# Patient Record
Sex: FEMALE | Race: WHITE | NOT HISPANIC OR LATINO | Employment: OTHER | URBAN - METROPOLITAN AREA
[De-identification: names, ages, dates, MRNs, and addresses within clinical notes are randomized per-mention and may not be internally consistent; named-entity substitution may affect disease eponyms.]

---

## 2023-05-10 ENCOUNTER — APPOINTMENT (OUTPATIENT)
Dept: GENERAL RADIOLOGY | Facility: CLINIC | Age: 73
End: 2023-05-10
Attending: EMERGENCY MEDICINE
Payer: MEDICARE

## 2023-05-10 ENCOUNTER — TRANSFERRED RECORDS (OUTPATIENT)
Dept: HEALTH INFORMATION MANAGEMENT | Facility: CLINIC | Age: 73
End: 2023-05-10

## 2023-05-10 ENCOUNTER — HOSPITAL ENCOUNTER (OUTPATIENT)
Facility: CLINIC | Age: 73
Setting detail: OBSERVATION
Discharge: HOME | End: 2023-05-11
Attending: EMERGENCY MEDICINE | Admitting: INTERNAL MEDICINE
Payer: MEDICARE

## 2023-05-10 DIAGNOSIS — I48.91 ATRIAL FIBRILLATION WITH RVR (H): ICD-10-CM

## 2023-05-10 DIAGNOSIS — I42.9 CARDIOMYOPATHY, UNSPECIFIED TYPE (H): Primary | ICD-10-CM

## 2023-05-10 LAB
ANION GAP SERPL CALCULATED.3IONS-SCNC: 13 MMOL/L (ref 7–15)
ATRIAL RATE - MUSE: NORMAL BPM
BASOPHILS # BLD AUTO: 0.1 10E3/UL (ref 0–0.2)
BASOPHILS NFR BLD AUTO: 1 %
BUN SERPL-MCNC: 26.4 MG/DL (ref 8–23)
CALCIUM SERPL-MCNC: 9.6 MG/DL (ref 8.8–10.2)
CHLORIDE SERPL-SCNC: 103 MMOL/L (ref 98–107)
CREAT SERPL-MCNC: 1.31 MG/DL (ref 0.51–0.95)
DEPRECATED HCO3 PLAS-SCNC: 23 MMOL/L (ref 22–29)
DIASTOLIC BLOOD PRESSURE - MUSE: NORMAL MMHG
EOSINOPHIL # BLD AUTO: 0.2 10E3/UL (ref 0–0.7)
EOSINOPHIL NFR BLD AUTO: 3 %
ERYTHROCYTE [DISTWIDTH] IN BLOOD BY AUTOMATED COUNT: 13.8 % (ref 10–15)
GFR SERPL CREATININE-BSD FRML MDRD: 43 ML/MIN/1.73M2
GLUCOSE SERPL-MCNC: 100 MG/DL (ref 70–99)
HCT VFR BLD AUTO: 42.8 % (ref 35–47)
HGB BLD-MCNC: 14.2 G/DL (ref 11.7–15.7)
IMM GRANULOCYTES # BLD: 0 10E3/UL
IMM GRANULOCYTES NFR BLD: 0 %
INTERPRETATION ECG - MUSE: NORMAL
LYMPHOCYTES # BLD AUTO: 1.3 10E3/UL (ref 0.8–5.3)
LYMPHOCYTES NFR BLD AUTO: 21 %
MAGNESIUM SERPL-MCNC: 1.9 MG/DL (ref 1.7–2.3)
MCH RBC QN AUTO: 32.1 PG (ref 26.5–33)
MCHC RBC AUTO-ENTMCNC: 33.2 G/DL (ref 31.5–36.5)
MCV RBC AUTO: 97 FL (ref 78–100)
MONOCYTES # BLD AUTO: 0.6 10E3/UL (ref 0–1.3)
MONOCYTES NFR BLD AUTO: 10 %
NEUTROPHILS # BLD AUTO: 4 10E3/UL (ref 1.6–8.3)
NEUTROPHILS NFR BLD AUTO: 65 %
NRBC # BLD AUTO: 0 10E3/UL
NRBC BLD AUTO-RTO: 0 /100
P AXIS - MUSE: NORMAL DEGREES
PLATELET # BLD AUTO: 246 10E3/UL (ref 150–450)
POTASSIUM SERPL-SCNC: 4.1 MMOL/L (ref 3.4–5.3)
PR INTERVAL - MUSE: NORMAL MS
QRS DURATION - MUSE: 94 MS
QT - MUSE: 344 MS
QTC - MUSE: 494 MS
R AXIS - MUSE: 7 DEGREES
RBC # BLD AUTO: 4.43 10E6/UL (ref 3.8–5.2)
SODIUM SERPL-SCNC: 139 MMOL/L (ref 136–145)
SYSTOLIC BLOOD PRESSURE - MUSE: NORMAL MMHG
T AXIS - MUSE: 71 DEGREES
TROPONIN T SERPL HS-MCNC: 31 NG/L
TROPONIN T SERPL HS-MCNC: 36 NG/L
TROPONIN T SERPL HS-MCNC: 39 NG/L
TSH SERPL DL<=0.005 MIU/L-ACNC: 1.07 UIU/ML (ref 0.3–4.2)
VENTRICULAR RATE- MUSE: 124 BPM
WBC # BLD AUTO: 6.2 10E3/UL (ref 4–11)

## 2023-05-10 PROCEDURE — 84484 ASSAY OF TROPONIN QUANT: CPT | Mod: 91 | Performed by: PHYSICIAN ASSISTANT

## 2023-05-10 PROCEDURE — 96365 THER/PROPH/DIAG IV INF INIT: CPT

## 2023-05-10 PROCEDURE — 250N000013 HC RX MED GY IP 250 OP 250 PS 637: Performed by: PHYSICIAN ASSISTANT

## 2023-05-10 PROCEDURE — 85025 COMPLETE CBC W/AUTO DIFF WBC: CPT | Performed by: EMERGENCY MEDICINE

## 2023-05-10 PROCEDURE — 99285 EMERGENCY DEPT VISIT HI MDM: CPT | Mod: 25

## 2023-05-10 PROCEDURE — 250N000009 HC RX 250: Performed by: EMERGENCY MEDICINE

## 2023-05-10 PROCEDURE — 99222 1ST HOSP IP/OBS MODERATE 55: CPT | Mod: A1 | Performed by: PHYSICIAN ASSISTANT

## 2023-05-10 PROCEDURE — 84484 ASSAY OF TROPONIN QUANT: CPT | Performed by: EMERGENCY MEDICINE

## 2023-05-10 PROCEDURE — 96360 HYDRATION IV INFUSION INIT: CPT

## 2023-05-10 PROCEDURE — 250N000009 HC RX 250: Performed by: PHYSICIAN ASSISTANT

## 2023-05-10 PROCEDURE — 99204 OFFICE O/P NEW MOD 45 MIN: CPT | Mod: FS | Performed by: NURSE PRACTITIONER

## 2023-05-10 PROCEDURE — 258N000003 HC RX IP 258 OP 636: Performed by: PHYSICIAN ASSISTANT

## 2023-05-10 PROCEDURE — G0378 HOSPITAL OBSERVATION PER HR: HCPCS

## 2023-05-10 PROCEDURE — 71046 X-RAY EXAM CHEST 2 VIEWS: CPT

## 2023-05-10 PROCEDURE — 250N000013 HC RX MED GY IP 250 OP 250 PS 637: Performed by: INTERNAL MEDICINE

## 2023-05-10 PROCEDURE — 36415 COLL VENOUS BLD VENIPUNCTURE: CPT | Performed by: PHYSICIAN ASSISTANT

## 2023-05-10 PROCEDURE — 83735 ASSAY OF MAGNESIUM: CPT | Performed by: PHYSICIAN ASSISTANT

## 2023-05-10 PROCEDURE — 36415 COLL VENOUS BLD VENIPUNCTURE: CPT | Performed by: EMERGENCY MEDICINE

## 2023-05-10 PROCEDURE — 93005 ELECTROCARDIOGRAM TRACING: CPT

## 2023-05-10 PROCEDURE — 84443 ASSAY THYROID STIM HORMONE: CPT | Performed by: PHYSICIAN ASSISTANT

## 2023-05-10 PROCEDURE — 250N000013 HC RX MED GY IP 250 OP 250 PS 637: Performed by: NURSE PRACTITIONER

## 2023-05-10 PROCEDURE — 250N000011 HC RX IP 250 OP 636: Performed by: EMERGENCY MEDICINE

## 2023-05-10 PROCEDURE — 96376 TX/PRO/DX INJ SAME DRUG ADON: CPT

## 2023-05-10 PROCEDURE — 258N000003 HC RX IP 258 OP 636: Performed by: EMERGENCY MEDICINE

## 2023-05-10 PROCEDURE — 80048 BASIC METABOLIC PNL TOTAL CA: CPT | Performed by: EMERGENCY MEDICINE

## 2023-05-10 PROCEDURE — 96366 THER/PROPH/DIAG IV INF ADDON: CPT

## 2023-05-10 PROCEDURE — 96361 HYDRATE IV INFUSION ADD-ON: CPT

## 2023-05-10 RX ORDER — ONDANSETRON 2 MG/ML
4 INJECTION INTRAMUSCULAR; INTRAVENOUS EVERY 6 HOURS PRN
Status: DISCONTINUED | OUTPATIENT
Start: 2023-05-10 | End: 2023-05-11 | Stop reason: HOSPADM

## 2023-05-10 RX ORDER — LISINOPRIL 10 MG/1
10 TABLET ORAL DAILY
COMMUNITY
End: 2023-11-22

## 2023-05-10 RX ORDER — PROCHLORPERAZINE 25 MG
12.5 SUPPOSITORY, RECTAL RECTAL EVERY 12 HOURS PRN
Status: DISCONTINUED | OUTPATIENT
Start: 2023-05-10 | End: 2023-05-11 | Stop reason: HOSPADM

## 2023-05-10 RX ORDER — METOPROLOL SUCCINATE 100 MG/1
100 TABLET, EXTENDED RELEASE ORAL DAILY
Status: DISCONTINUED | OUTPATIENT
Start: 2023-05-11 | End: 2023-05-11

## 2023-05-10 RX ORDER — LIDOCAINE 40 MG/G
CREAM TOPICAL
Status: DISCONTINUED | OUTPATIENT
Start: 2023-05-10 | End: 2023-05-11 | Stop reason: HOSPADM

## 2023-05-10 RX ORDER — AMOXICILLIN 250 MG
2 CAPSULE ORAL 2 TIMES DAILY PRN
Status: DISCONTINUED | OUTPATIENT
Start: 2023-05-10 | End: 2023-05-11 | Stop reason: HOSPADM

## 2023-05-10 RX ORDER — ASPIRIN 81 MG/1
81 TABLET ORAL DAILY
Status: ON HOLD | COMMUNITY
End: 2023-05-11

## 2023-05-10 RX ORDER — DILTIAZEM HYDROCHLORIDE 5 MG/ML
15 INJECTION INTRAVENOUS ONCE
Status: COMPLETED | OUTPATIENT
Start: 2023-05-10 | End: 2023-05-10

## 2023-05-10 RX ORDER — POLYETHYLENE GLYCOL 3350 17 G/17G
17 POWDER, FOR SOLUTION ORAL DAILY PRN
Status: DISCONTINUED | OUTPATIENT
Start: 2023-05-10 | End: 2023-05-11 | Stop reason: HOSPADM

## 2023-05-10 RX ORDER — LISINOPRIL 10 MG/1
10 TABLET ORAL DAILY
Status: DISCONTINUED | OUTPATIENT
Start: 2023-05-11 | End: 2023-05-11 | Stop reason: HOSPADM

## 2023-05-10 RX ORDER — ASPIRIN 81 MG/1
81 TABLET ORAL DAILY
Status: DISCONTINUED | OUTPATIENT
Start: 2023-05-11 | End: 2023-05-11

## 2023-05-10 RX ORDER — ATORVASTATIN CALCIUM 10 MG/1
10 TABLET, FILM COATED ORAL EVERY EVENING
Status: DISCONTINUED | OUTPATIENT
Start: 2023-05-10 | End: 2023-05-11 | Stop reason: HOSPADM

## 2023-05-10 RX ORDER — ONDANSETRON 4 MG/1
4 TABLET, ORALLY DISINTEGRATING ORAL EVERY 6 HOURS PRN
Status: DISCONTINUED | OUTPATIENT
Start: 2023-05-10 | End: 2023-05-11 | Stop reason: HOSPADM

## 2023-05-10 RX ORDER — METOPROLOL SUCCINATE 50 MG/1
50 TABLET, EXTENDED RELEASE ORAL DAILY
Status: DISCONTINUED | OUTPATIENT
Start: 2023-05-10 | End: 2023-05-10

## 2023-05-10 RX ORDER — AMOXICILLIN 250 MG
1 CAPSULE ORAL 2 TIMES DAILY PRN
Status: DISCONTINUED | OUTPATIENT
Start: 2023-05-10 | End: 2023-05-11 | Stop reason: HOSPADM

## 2023-05-10 RX ORDER — METOPROLOL SUCCINATE 50 MG/1
50 TABLET, EXTENDED RELEASE ORAL ONCE
Status: COMPLETED | OUTPATIENT
Start: 2023-05-10 | End: 2023-05-10

## 2023-05-10 RX ORDER — ATORVASTATIN CALCIUM 10 MG/1
10 TABLET, FILM COATED ORAL EVERY EVENING
COMMUNITY
End: 2023-09-01 | Stop reason: DRUGHIGH

## 2023-05-10 RX ORDER — PROCHLORPERAZINE MALEATE 5 MG
5 TABLET ORAL EVERY 6 HOURS PRN
Status: DISCONTINUED | OUTPATIENT
Start: 2023-05-10 | End: 2023-05-11 | Stop reason: HOSPADM

## 2023-05-10 RX ADMIN — SODIUM CHLORIDE 1000 ML: 9 INJECTION, SOLUTION INTRAVENOUS at 08:49

## 2023-05-10 RX ADMIN — METOPROLOL SUCCINATE 50 MG: 50 TABLET, EXTENDED RELEASE ORAL at 18:58

## 2023-05-10 RX ADMIN — APIXABAN 5 MG: 5 TABLET, FILM COATED ORAL at 21:34

## 2023-05-10 RX ADMIN — DILTIAZEM HYDROCHLORIDE 5 MG/HR: 5 INJECTION, SOLUTION INTRAVENOUS at 12:25

## 2023-05-10 RX ADMIN — DILTIAZEM HYDROCHLORIDE 15 MG: 5 INJECTION INTRAVENOUS at 11:19

## 2023-05-10 RX ADMIN — METOPROLOL SUCCINATE 50 MG: 50 TABLET, EXTENDED RELEASE ORAL at 15:39

## 2023-05-10 RX ADMIN — DILTIAZEM HYDROCHLORIDE 15 MG/HR: 5 INJECTION, SOLUTION INTRAVENOUS at 21:31

## 2023-05-10 RX ADMIN — ATORVASTATIN CALCIUM 10 MG: 10 TABLET, FILM COATED ORAL at 21:34

## 2023-05-10 ASSESSMENT — ENCOUNTER SYMPTOMS
PALPITATIONS: 0
BLOOD IN STOOL: 0
DIARRHEA: 0
NAUSEA: 0
SHORTNESS OF BREATH: 0

## 2023-05-10 ASSESSMENT — ACTIVITIES OF DAILY LIVING (ADL)
ADLS_ACUITY_SCORE: 35

## 2023-05-10 NOTE — ED PROVIDER NOTES
History   Chief Complaint:  Atrial Fib     The history is provided by the patient.      Alysha Hurst is a 73 year old female with a history of hypertension who presents with Afib. The patient reports that she was at her eye surgery center for cataract and they noticed she is in Afib. She denies any chest pain, nausea, shortness of breath, palpitations, leg swelling, blood in stool, or diarrhea. She had a preoperative visit last week and she was not found to be in Afib, and she also had a physical in February. She denies any history of blood clots, new medication, or recent travel. She had cardiomyopathy in 2000 and saw , and has had no heart issues since. She does not drink alcohol often.     Review of External Notes:  None    ROS:  Review of Systems   Respiratory: Negative for shortness of breath.    Cardiovascular: Negative for chest pain, palpitations and leg swelling.   Gastrointestinal: Negative for blood in stool, diarrhea and nausea.   All other systems reviewed and are negative.    Allergies:  Ciprofloxacin  Sulfacetamide     Medications:    Aspirin   Lipitor   Lisinopril     Past Medical History:    Hypertension   Hyperlipidemia   Cardiomyopathy   Disorder of bone and cartilage, unspecified   Gout     Social History:  The patient presents with her .   PCP: No Ref-Primary, Physician     Physical Exam     Patient Vitals for the past 24 hrs:   BP Temp Temp src Pulse Resp SpO2   05/10/23 1345 (!) 151/102 -- -- 93 16 98 %   05/10/23 1240 (!) 135/107 -- -- 89 19 99 %   05/10/23 1230 -- -- -- 92 18 99 %   05/10/23 1200 -- -- -- 84 14 --   05/10/23 1130 -- -- -- 94 16 --   05/10/23 1100 (!) 158/127 -- -- (!) 135 23 96 %   05/10/23 1030 (!) 139/102 -- -- (!) 128 14 99 %   05/10/23 1000 (!) 131/102 -- -- (!) 134 13 97 %   05/10/23 0937 -- 98  F (36.7  C) Oral -- -- --   05/10/23 0856 (!) 127/105 -- -- (!) 128 18 98 %   05/10/23 0757 (!) 165/99 -- -- (!) 122 16 98 %      Physical Exam  SKIN:   Warm, dry.  HEMATOLOGIC/IMMUNOLOGIC/LYMPHATIC:  No pallor.  Mild equal bilateral lower limb edema, nonpitting.  EYES:  Conjunctivae normal.  CARDIOVASCULAR: Tachycardic rate, irregularly irregular rhythm, no murmur, no rub.  RESPIRATORY:  No respiratory distress, breath sounds equal and normal.  GASTROINTESTINAL:  Soft, nontender abdomen.  MUSCULOSKELETAL: Overweight body habitus.  NEUROLOGIC:  Alert, conversant.  PSYCHIATRIC:  Normal mood.    Emergency Department Course   ECG  ECG taken at 0748, ECG read at 0803  Atrial fibrillation with rapid ventricular response with premature ventricular or aberrantly conducted complexes   Incomplete right bundle branch block    Possible Anteroseptal infarct age undetermined   Abnormal ECG  New Afib as compared to prior, dated 10/30/00.  Rate 124 bpm. CA interval * ms. QRS duration 94 ms. QT/QTc 344/494 ms. P-R-T axes * 7 71.     Imaging:  XR Chest 2 Views   Final Result   IMPRESSION: PA and lateral views of the chest were obtained. Enlarged   cardiac silhouette. Basilar pulmonary opacities, likely atelectasis.   No significant pleural effusion or pneumothorax. Multilevel   degenerative changes of the spine.      JOAQUINA PASCAL MD            SYSTEM ID:  [id number]         Report per radiology    Laboratory:  Labs Ordered and Resulted from Time of ED Arrival to Time of ED Departure   BASIC METABOLIC PANEL - Abnormal       Result Value    Sodium 139      Potassium 4.1      Chloride 103      Carbon Dioxide (CO2) 23      Anion Gap 13      Urea Nitrogen 26.4 (*)     Creatinine 1.31 (*)     Calcium 9.6      Glucose 100 (*)     GFR Estimate 43 (*)    TROPONIN T, HIGH SENSITIVITY - Abnormal    Troponin T, High Sensitivity 39 (*)    TROPONIN T, HIGH SENSITIVITY - Abnormal    Troponin T, High Sensitivity 31 (*)    CBC WITH PLATELETS AND DIFFERENTIAL    WBC Count 6.2      RBC Count 4.43      Hemoglobin 14.2      Hematocrit 42.8      MCV 97      MCH 32.1      MCHC 33.2      RDW 13.8       Platelet Count 246      % Neutrophils 65      % Lymphocytes 21      % Monocytes 10      % Eosinophils 3      % Basophils 1      % Immature Granulocytes 0      NRBCs per 100 WBC 0      Absolute Neutrophils 4.0      Absolute Lymphocytes 1.3      Absolute Monocytes 0.6      Absolute Eosinophils 0.2      Absolute Basophils 0.1      Absolute Immature Granulocytes 0.0      Absolute NRBCs 0.0     TROPONIN T, HIGH SENSITIVITY      Emergency Department Course & Assessments:  Interventions:  Medications   diltiazem (CARDIZEM) 125 mg in sodium chloride 0.9 % 125 mL infusion (10 mg/hr Intravenous Rate/Dose Change 5/10/23 1315)   0.9% sodium chloride BOLUS (0 mLs Intravenous Stopped 5/10/23 1232)   diltiazem (CARDIZEM) injection 15 mg (15 mg Intravenous $Given 5/10/23 1119)     Assessments:  0756 I obtained history and examined the patient as reported above.   1052 I rechecked the patient and discussed her plan of care.     Independent Interpretation (X-rays, CTs, rhythm strip):  Chest x-ray: No pleural effusion, normal cardiac silhouette.    Consultations/Discussion of Management or Tests:  1103 I spoke with Dr. Renae of the hospitalist service from [facility] regarding patient's presentation, findings, and plan of care.    Disposition:  The patient was admitted to the hospital under the care of Dr. Renae.     Impression & Plan    Medical Decision Making:  This patient presents from the surgical center with concern of new onset atrial fibrillation.  The patient did arrive in atrial fibrillation with rapid ventricular response.  Tolerating this well hemodynamically.  She is minimally symptomatic, only complains of episodic dizziness but this has been a longstanding symptom.  Unclear exactly when her atrial fibrillation began.  She had a outpatient preoperative visit last week which she reports was normal regarding her heart rhythm.  EKG was not performed at that time, per the patient.  We initiated hydration and  testing as above.  Testing was reassuring barring slight elevation of the troponin which is likely secondary to rate dependent ischemia.  Rate control was achieved with diltiazem bolus and drip.  The patient lacks any risk factors beyond her age for pulmonary embolism.  Denies chest pain or dyspnea.  I do not think she required a D-dimer nor CT scan of the chest at this time given my low suspicion for PE.  The patient will be admitted for further cardiac care and monitoring.    Diagnosis:    ICD-10-CM    1. Atrial fibrillation with RVR (H)  I48.91             Scribe Disclosure:  ITesfaye, am serving as a scribe at 7:55 AM on 5/10/2023 to document services personally performed by Neo Bishop MD based on my observations and the provider's statements to me.      5/10/2023   Neo Bishop MD Moe, James Thomas, MD  05/10/23 1166

## 2023-05-10 NOTE — CONSULTS
[facility]    Cardiology Consultation     Date of Admission:  5/10/2023    Assessment & Plan   Alysha Hurst is a 73 year old female who was admitted on 5/10/2023.    1. Afib with RVR, new diagnosis  2. Mild troponin elevation likely 2/2 demand ischemia in the setting of #1  3. Hypertension  4. Hyperlipidemia  5. Hx of cardiomyopathy in 2000, saw Dr. Sanders, unclear etiology (no records)      Patient remains in A-fib RVR with heart rates in the 90s to 120s.  She reports being under a lot of stress over the last couple weeks with new medications she was needing to take prior to her cataract surgery.  She is not a heavy drinker.  She has no history of hypothyroidism.  No known structural heart disease and no recent illness.      She is hemodynamically stable.  On diltiazem drip at 15 mL/h.  Unclear how long she has been in atrial fibrillation and given the fact she remains completely asymptomatic, recommend rate control strategy and initiation of anticoagulation for stroke prophylaxis.  We will also obtain echocardiogram to assess LV function as well as any structural abnormalities.  She is euvolemic and does not have any clinical signs of heart failure.    Plan:   1. Agree with diltiazem gtt, wean as tolerated.    2. Start Toprol XL 50 mg daily.   3. Start apixaban 5 mg BID for stroke prophylaxis.   4. Obtain echocardiogram.   5. Will continue to follow.       Moderate complexity     Magalys De Santiago CNP, MD    Primary Care Physician   Physician No Ref-Primary    Reason for Consult   Reason for consult: I was asked to evaluate this patient for AFib RVR.    History of Present Illness   Alysha Hurst is a 73 year old female who presents with Alysha Hurst is a 73 year old female with a past medical history of hypertension, and hyperlipidemia who presented to the emergency department from the eye clinic due to finding of atrial fibrillation with RVR.  Patient reports that she  was at the eye surgery center for a planned cataract surgery today when the staff noticed that she had a rapid heart rate and was in atrial fibrillation with RVR.  Patient denies any known history of A-fib.  She denies any recent chest pain, palpitations, shortness of breath, leg swelling, or other flulike symptoms, fever, chills, or recent illness.     In the emergency department, patient noted to have heart rate in the 120-130s.  Blood pressure in the 130-150s systolic.  Temperature 98.  O2 satting at 98% on RA.  EKG shows A-fib with RVR, RBBB, no overt ischemic changes.  CXR with bilateral ectasis, no other significant abnormalities.  High-sensitivity troponin mildly elevated 39. Potassium 4.1, creatinine 1.3.  No leukocytosis or anemia.  Patient was given IV diltiazem with some improvement of her heart rate, but she is still tachycardic.    Her most recent echocardiogram from 2012 shows preserved LV function with an EF of 65%.     Past Medical History   No past medical history on file.      Past Surgical History   No past surgical history on file.      Prior to Admission Medications   Prior to Admission Medications   Prescriptions Last Dose Informant Patient Reported? Taking?   CALCIUM PO 5/9/2023  Yes Yes   Sig: Take 1 tablet by mouth daily   aspirin 81 MG EC tablet 5/9/2023  Yes Yes   Sig: Take 81 mg by mouth daily   atorvastatin (LIPITOR) 10 MG tablet 5/9/2023  Yes Yes   Sig: Take 10 mg by mouth every evening   lisinopril (ZESTRIL) 10 MG tablet 5/10/2023  Yes Yes   Sig: Take 10 mg by mouth daily      Facility-Administered Medications: None     Current Facility-Administered Medications   Medication Dose Route Frequency     Current Facility-Administered Medications   Medication Last Rate     dilTIAZem 10 mg/hr (05/10/23 1315)     Allergies   Allergies   Allergen Reactions     Ciprofloxacin Other (See Comments)     tendonitis     Sulfacetamide      Happened in high school during mono treatment, pt doesn't recall  reaction.        Social History          Family History            Review of Systems   A comprehensive review of system was performed and is negative other than that noted in the HPI or here.     Physical Exam   Vital Signs with Ranges  Temp:  [98  F (36.7  C)] 98  F (36.7  C)  Pulse:  [] 124  Resp:  [11-23] 17  BP: (124-165)/() 124/90  SpO2:  [96 %-99 %] 97 %  Wt Readings from Last 4 Encounters:   No data found for Wt     No intake/output data recorded.      Vitals: BP (!) 124/90   Pulse (!) 124   Temp 98  F (36.7  C) (Oral)   Resp 17   SpO2 97%     Physical Exam:   General - Alert and oriented to time place and person in no acute distress  Eyes - No scleral icterus  HEENT - Neck supple, moist mucous membranes  Cardiovascular - irregular rate or rhythm   Extremities - There is no edema  Respiratory - CTA bilaterally   Skin - No pallor or cyanosis  Gastrointestinal - Non tender and non distended without rebound or guarding  Psych - Appropriate affect   Neurological - No gross motor neurological focal deficits    No lab results found in last 7 days.    Invalid input(s): TROPONINIES    Recent Labs   Lab 05/10/23  0818   WBC 6.2   HGB 14.2   MCV 97         POTASSIUM 4.1   CHLORIDE 103   CO2 23   BUN 26.4*   CR 1.31*   GFRESTIMATED 43*   ANIONGAP 13   CHLOE 9.6   *     No results for input(s): CHOL, HDL, LDL, TRIG, CHOLHDLRATIO in the last 63027 hours.  Recent Labs   Lab 05/10/23  0818   WBC 6.2   HGB 14.2   HCT 42.8   MCV 97        No results for input(s): PH, PHV, PO2, PO2V, SAT, PCO2, PCO2V, HCO3, HCO3V in the last 168 hours.  No results for input(s): NTBNPI, NTBNP in the last 168 hours.  No results for input(s): DD in the last 168 hours.  No results for input(s): SED, CRP in the last 168 hours.  Recent Labs   Lab 05/10/23  0818        No results for input(s): TSH in the last 168 hours.  No results for input(s): COLOR, APPEARANCE, URINEGLC, URINEBILI, URINEKETONE, SG,  UBLD, URINEPH, PROTEIN, UROBILINOGEN, NITRITE, LEUKEST, RBCU, WBCU in the last 168 hours.    Imaging:  Recent Results (from the past 48 hour(s))   XR Chest 2 Views    Narrative    CHEST TWO VIEWS May 10, 2023 8:37 AM     HISTORY: New onset atrial fibrillation with RVR.    COMPARISON: None available.      Impression    IMPRESSION: PA and lateral views of the chest were obtained. Enlarged  cardiac silhouette. Basilar pulmonary opacities, likely atelectasis.  No significant pleural effusion or pneumothorax. Multilevel  degenerative changes of the spine.    JOAQUINA PASCAL MD         SYSTEM ID:  [id number]       Echo:  No results found for this or any previous visit (from the past 4320 hour(s)).    Clinically Significant Risk Factors Present on Admission                  # Hypertension: home medication list includes antihypertensive(s)

## 2023-05-10 NOTE — PHARMACY-ADMISSION MEDICATION HISTORY
Pharmacist Admission Medication History    Admission medication history is complete. The information provided in this note is only as accurate as the sources available at the time of the update.    Medication reconciliation/reorder completed by provider prior to medication history? No    Information Source(s): Patient and CareEverywhere/SureScripts via in-person    Changes made to PTA medication list:    Added: all medications     Deleted: None    Changed: None    Allergies reviewed with patient and updates made in EHR: yes    Medication History Completed By: Ree Anguiano RPH 5/10/2023 9:57 AM    Prior to Admission medications    Medication Sig Last Dose Taking? Auth Provider Long Term End Date   aspirin 81 MG EC tablet Take 81 mg by mouth daily 5/9/2023 Yes Unknown, Entered By History     atorvastatin (LIPITOR) 10 MG tablet Take 10 mg by mouth every evening 5/9/2023 Yes Unknown, Entered By History Yes    CALCIUM PO Take 1 tablet by mouth daily 5/9/2023 Yes Unknown, Entered By History     lisinopril (ZESTRIL) 10 MG tablet Take 10 mg by mouth daily 5/10/2023 Yes Unknown, Entered By History Yes        Ree Anguiano, PharmD, BCCCP

## 2023-05-10 NOTE — ED TRIAGE NOTES
Pt presents to ED via triage with . Pt went to [facility] this morning for cataract surgery and was found to be in afib and sent to ED for evaluation. Pt denies any symptoms, states besides feeling dehydrated from not eating or drinking in preparation for surgery she feels at baseline. States taking baby Asprin daily, denies blood thinners, no history of afib. Denies CP or SOB     Triage Assessment     Row Name 05/10/23 0781       Triage Assessment (Adult)    Airway WDL WDL       Cardiac WDL    Cardiac WDL WDL       Cognitive/Neuro/Behavioral WDL    Cognitive/Neuro/Behavioral WDL WDL

## 2023-05-10 NOTE — ED NOTES
[facility]  ED Nurse Handoff Report    ED Chief complaint: Atrial Fib      ED Diagnosis:   Final diagnoses:   Atrial fibrillation with RVR (H)       Code Status: Full Code    Allergies:   Allergies   Allergen Reactions     Ciprofloxacin Other (See Comments)     tendonitis     Sulfacetamide      Happened in high school during mono treatment, pt doesn't recall reaction.        Patient Story: Was going for cataract surgery this AM, was told to come here by surgery center.   Focused Assessment:  Rapid afib, no SOB but reports mild light headedness.     Treatments and/or interventions provided: Labs, CXR, Dilt gtts, fluid bolus  Patient's response to treatments and/or interventions: Stable    To be done/followed up on inpatient unit:  Per attending     Does this patient have any cognitive concerns?: N/A    Activity level - Baseline/Home:  Independent  Activity Level - Current:   Independent    Patient's Preferred language: English   Needed?: No    Isolation: None  Infection: Not Applicable  Patient tested for COVID 19 prior to admission: NO  Bariatric?: No    Vital Signs:   Vitals:    05/10/23 0757 05/10/23 0856 05/10/23 0937   BP: (!) 165/99 (!) 127/105    Pulse: (!) 122 (!) 128    Resp: 16 18    Temp:   98  F (36.7  C)   TempSrc:   Oral   SpO2: 98% 98%        Cardiac Rhythm:Cardiac Rhythm: Atrial fibrillation    Was the PSS-3 completed:   Yes  What interventions are required if any?               Family Comments:  at bedside, supportive   OBS brochure/video discussed/provided to patient/family: No              Name of person given brochure if not patient:               Relationship to patient:     For the majority of the shift this patient's behavior was Green.   Behavioral interventions performed were Care and rounding.    ED NURSE PHONE NUMBER: *[phone]

## 2023-05-10 NOTE — PROGRESS NOTES
RECEIVING UNIT ED HANDOFF REVIEW    ED Nurse Handoff Report was reviewed by: Laila Gan RN on May 10, 2023 at 4:56 PM

## 2023-05-10 NOTE — H&P
[facility]    History and Physical  Hospitalist       Date of Admission:  5/10/2023  Date of Service (when I saw the patient): 05/10/23    Assessment & Plan   Alysha Hurst is a 73 year old female with a past medical history of hypertension, cardiomyopathy (resolved), and hyperlipidemia who presented to the emergency department from the eye clinic due to finding of atrial fibrillation with RVR.  Registered observation for further management.    Atrial fibrillation with RVR, new diagnosis   Troponin elevation  Patient denies any known history of A-fib.  She denies any recent chest pain, palpitations, shortness of breath, leg swelling, or other recent illness.  *EKG shows A-fib with RVR, RBBB, no overt ischemic changes.  *CXR with bilateral ectasis, no other significant abnormalities  *High-sensitivity troponin mildly elevated 39 likely demand ischemia.  Potassium 4.1, creatinine 1.3.  No leukocytosis or anemia.  --Hemodynamically stable at this time.  Monitor vitals.  --Diltiazem given in ER, infusion initiated.  Heart rate improving so can likely transition to p.o. antiarrhythmics later today or tomorrow.  --Cardiology consulted given A-fib with RVR and troponin elevation  --Echocardiogram ordered  --Add on TSH and magnesium level  --Serial troponins  --Continuous telemetry    Hypertension  Hyperlipidemia  --Continue PTA lisinopril 10 mg daily with hold parameters  --Continue PTA Lipitor  --Monitor BP while on diltiazem    Hx cardiomyopathy  Patient reportedly had a history of cardiomyopathy in 2000 and saw Dr. Sanders of cardiology.  She states that this resolved and she has had no heart issues since then.  --We will check echocardiogram    CKD, stage 3  Creatinine 1.3.  Previous baseline in Care Everywhere appears to be 1.2-1.3. --Will monitor.      Diet: Regular diet  DVT Prophylaxis: Low Risk/Ambulatory with no VTE prophylaxis indicated  Andrews Catheter: Not present  Lines: None      Cardiac Monitoring: None  Code Status: Full Code    Disposition Plan         Observation status, anticipated discharge tomorrow 5/10     Clinically Significant Risk Factors Present on Admission                  # Hypertension: home medication list includes antihypertensive(s)              The patient has been discussed with Dr. Renae, who agrees with the assessment and plan at this time.     Securely message with Vocera (more info)  Text page via [facility] Paging/Directory     HAROON Valdez    Primary Care Physician   Dr. Zoya Quezada, DO    Chief Complaint   Afib with RVR - [facility]    History is obtained from the patient as well as ED provider report.    History of Present Illness   Alysha Hurst is a 73 year old female with a past medical history of hypertension, cardiomyopathy (resolved), and hyperlipidemia who presented to the emergency department from the eye clinic due to finding of atrial fibrillation with RVR.  Patient reports that she was at the eye surgery center for a planned cataract surgery today when the staff noticed that she had a rapid heart rate and was in atrial fibrillation with RVR.  Patient denies any      any known history of A-fib.  She denies any recent chest pain, palpitations, shortness of breath, leg swelling, or other flulike symptoms, fever, chills, or recent illness.    In the emergency department, patient noted to have heart rate in the 120-130s.  Blood pressure in the 130-150s systolic.  Temperature 98.  O2 satting at 98% on RA.  EKG shows A-fib with RVR, RBBB, no overt ischemic changes.  CXR with bilateral ectasis, no other significant abnormalities.  High-sensitivity troponin mildly elevated 39.  Potassium 4.1, creatinine 1.3.  No leukocytosis or anemia.  Patient was given IV diltiazem with some improvement of her heart rate, but she is still tachycardic.  She is registered to observation for further management of atrial fibrillation with RVR and  further cardiac evaluation.    Past Medical History    I have reviewed this patient's medical history and updated it with pertinent information if needed.     Hypertension     Hyperlipidemia     Cardiomyopathy     Disorder of bone and cartilage, unspecified     Gout    Social History   Patient presents with her .  She is a non-smoker.  Uses alcohol seldomly.    Family History   I have reviewed this patient's family history and updated it with pertinent information if needed.   Patient reports she has both parents and a brother who had heart disease.  She reports that her father had atrial fibrillation.    Medications   Prior to Admission Medications   Prescriptions Last Dose Informant Patient Reported? Taking?   CALCIUM PO 5/9/2023  Yes Yes   Sig: Take 1 tablet by mouth daily   aspirin 81 MG EC tablet 5/9/2023  Yes Yes   Sig: Take 81 mg by mouth daily   atorvastatin (LIPITOR) 10 MG tablet 5/9/2023  Yes Yes   Sig: Take 10 mg by mouth every evening   lisinopril (ZESTRIL) 10 MG tablet 5/10/2023  Yes Yes   Sig: Take 10 mg by mouth daily      Facility-Administered Medications: None     Allergies   Allergies   Allergen Reactions     Ciprofloxacin Other (See Comments)     tendonitis     Sulfacetamide      Happened in high school during mono treatment, pt doesn't recall reaction.        Review of Systems   The 10 point Review of Systems is negative other than noted in the HPI.    Physical Exam   Temp: 98  F (36.7  C) Temp src: Oral BP: (!) 127/105 Pulse: (!) 128   Resp: 18 SpO2: 98 % O2 Device: None (Room air)    Vital Signs with Ranges  Temp:  [98  F (36.7  C)] 98  F (36.7  C)  Pulse:  [122-128] 128  Resp:  [16-18] 18  BP: (127-165)/() 127/105  SpO2:  [98 %] 98 %  0 lbs 0 oz    Constitutional: Awake, alert, cooperative, sitting up in no apparent distress.    ENT: Normocephalic, without obvious abnormality, atraumatic, oropharynx with moist mucus membranes.  Eyes pupils are equal, round; Normal sclera.     Pulmonary: No increased work of breathing, good air exchange, clear to auscultation bilaterally, no crackles or wheezing.  Cardiovascular: Irregularly irregular, mildly tachycardic normal S1 and S2.  Grade 2/6 systolic murmur at the LSB.  GI: Normal bowel sounds, soft, non-distended.  Skin/Integumen: No rashes on exposed skin.  Neuro: CN II-XII grossly intact.  Moves all 4 ext with normal strength. Speech normal.  Psych:  Alert and oriented x 3. Normal mood and affect.  Extremities: No lower extremity edema noted.      Data   Data reviewed today:  I personally reviewed the EKG showing Afib with RVR and RBBB.  Personally reviewed all labs and imaging reports from this encounter.  Recent Labs   Lab 05/10/23  0818   WBC 6.2   HGB 14.2   MCV 97         POTASSIUM 4.1   CHLORIDE 103   CO2 23   BUN 26.4*   CR 1.31*   ANIONGAP 13   CHLOE 9.6   *       Results for orders placed or performed during the hospital encounter of 05/10/23 (from the past 24 hour(s))   EKG 12 lead   Result Value Ref Range    Systolic Blood Pressure  mmHg    Diastolic Blood Pressure  mmHg    Ventricular Rate 124 BPM    Atrial Rate  BPM    SD Interval  ms    QRS Duration 94 ms     ms    QTc 494 ms    P Axis  degrees    R AXIS 7 degrees    T Axis 71 degrees    Interpretation ECG       Atrial fibrillation with rapid ventricular response with premature ventricular or aberrantly conducted complexes  Incomplete right bundle branch block  Possible Anteroseptal infarct , age undetermined  Abnormal ECG  When compared with ECG of 30-OCT-2000 08:59,  Significant changes have occurred  Confirmed by GENERATED REPORT, COMPUTER ([id number]),  Livier Russell ([id number]) on 5/10/2023 8:00:31 AM     CBC with platelets differential    Narrative    The following orders were created for panel order CBC with platelets differential.  Procedure                               Abnormality         Status                     ---------                                -----------         ------                     CBC with platelets and d...[[id number]]                      Final result                 Please view results for these tests on the individual orders.   Basic metabolic panel   Result Value Ref Range    Sodium 139 136 - 145 mmol/L    Potassium 4.1 3.4 - 5.3 mmol/L    Chloride 103 98 - 107 mmol/L    Carbon Dioxide (CO2) 23 22 - 29 mmol/L    Anion Gap 13 7 - 15 mmol/L    Urea Nitrogen 26.4 (H) 8.0 - 23.0 mg/dL    Creatinine 1.31 (H) 0.51 - 0.95 mg/dL    Calcium 9.6 8.8 - 10.2 mg/dL    Glucose 100 (H) 70 - 99 mg/dL    GFR Estimate 43 (L) >60 mL/min/1.73m2   Troponin T, High Sensitivity   Result Value Ref Range    Troponin T, High Sensitivity 39 (H) <=14 ng/L   CBC with platelets and differential   Result Value Ref Range    WBC Count 6.2 4.0 - 11.0 10e3/uL    RBC Count 4.43 3.80 - 5.20 10e6/uL    Hemoglobin 14.2 11.7 - 15.7 g/dL    Hematocrit 42.8 35.0 - 47.0 %    MCV 97 78 - 100 fL    MCH 32.1 26.5 - 33.0 pg    MCHC 33.2 31.5 - 36.5 g/dL    RDW 13.8 10.0 - 15.0 %    Platelet Count 246 150 - 450 10e3/uL    % Neutrophils 65 %    % Lymphocytes 21 %    % Monocytes 10 %    % Eosinophils 3 %    % Basophils 1 %    % Immature Granulocytes 0 %    NRBCs per 100 WBC 0 <1 /100    Absolute Neutrophils 4.0 1.6 - 8.3 10e3/uL    Absolute Lymphocytes 1.3 0.8 - 5.3 10e3/uL    Absolute Monocytes 0.6 0.0 - 1.3 10e3/uL    Absolute Eosinophils 0.2 0.0 - 0.7 10e3/uL    Absolute Basophils 0.1 0.0 - 0.2 10e3/uL    Absolute Immature Granulocytes 0.0 <=0.4 10e3/uL    Absolute NRBCs 0.0 10e3/uL   XR Chest 2 Views    Narrative    CHEST TWO VIEWS May 10, 2023 8:37 AM     HISTORY: New onset atrial fibrillation with RVR.    COMPARISON: None available.      Impression    IMPRESSION: PA and lateral views of the chest were obtained. Enlarged  cardiac silhouette. Basilar pulmonary opacities, likely atelectasis.  No significant pleural effusion or pneumothorax. Multilevel  degenerative changes of the  spine.    JOAQUINA PASCAL MD         SYSTEM ID:  [id number]       I have personally reviewed the following data over the past 24 hrs:    6.2  \   14.2   / 246     139 103 26.4 (H) /  100 (H)   4.1 23 1.31 (H) \       Trop: 39 (H) BNP: N/A       Imaging results reviewed over the past 24 hrs:   Recent Results (from the past 24 hour(s))   XR Chest 2 Views    Narrative    CHEST TWO VIEWS May 10, 2023 8:37 AM     HISTORY: New onset atrial fibrillation with RVR.    COMPARISON: None available.      Impression    IMPRESSION: PA and lateral views of the chest were obtained. Enlarged  cardiac silhouette. Basilar pulmonary opacities, likely atelectasis.  No significant pleural effusion or pneumothorax. Multilevel  degenerative changes of the spine.    JOAQUINA PASCAL MD         SYSTEM ID:  [id number]     Recent Labs   Lab 05/10/23  0818   WBC 6.2   HGB 14.2   MCV 97         POTASSIUM 4.1   CHLORIDE 103   CO2 23   BUN 26.4*   CR 1.31*   ANIONGAP 13   CHLOE 9.6   *

## 2023-05-10 NOTE — PROGRESS NOTES
"SPIRITUAL HEALTH SERVICES Progress Note  [facility] - ED    Referral: Nursing request for emotional support.    I visited with Ptnt Alysha and her  iWcho, introducing myself and SH Services.    Alysha shared she \"doesn't know\" how she's feeling about having to be in the ED rather than having had her surgery, and she denied feelings of frustration or anger.    She is \"a practical person\" and wants to understand what's happening and why this arose. She said she knows more than initially and also wonders if \"all this is really necessary.\" She \"just wants to go home.\"    They both shared they had no SH needs at this time.    I affirmed experiences, shared words of reassurance, and encouraged self-advocacy. I invited them to reach out to SH as desired and departed with a commendation.    SH remains available.    Rev Shea Hernandez  Associate   [facility] Phone Line [phone]  [place] (Tuesdays & Thursdays) [phone]    "

## 2023-05-11 ENCOUNTER — APPOINTMENT (OUTPATIENT)
Dept: CARDIOLOGY | Facility: CLINIC | Age: 73
End: 2023-05-11
Attending: PHYSICIAN ASSISTANT
Payer: MEDICARE

## 2023-05-11 VITALS
HEART RATE: 83 BPM | SYSTOLIC BLOOD PRESSURE: 125 MMHG | WEIGHT: 192.5 LBS | DIASTOLIC BLOOD PRESSURE: 89 MMHG | OXYGEN SATURATION: 96 % | RESPIRATION RATE: 18 BRPM | TEMPERATURE: 97.9 F

## 2023-05-11 LAB
ANION GAP SERPL CALCULATED.3IONS-SCNC: 16 MMOL/L (ref 7–15)
BUN SERPL-MCNC: 22.2 MG/DL (ref 8–23)
CALCIUM SERPL-MCNC: 9.2 MG/DL (ref 8.8–10.2)
CHLORIDE SERPL-SCNC: 106 MMOL/L (ref 98–107)
CREAT SERPL-MCNC: 1.23 MG/DL (ref 0.51–0.95)
DEPRECATED HCO3 PLAS-SCNC: 18 MMOL/L (ref 22–29)
ERYTHROCYTE [DISTWIDTH] IN BLOOD BY AUTOMATED COUNT: 14 % (ref 10–15)
GFR SERPL CREATININE-BSD FRML MDRD: 47 ML/MIN/1.73M2
GLUCOSE SERPL-MCNC: 89 MG/DL (ref 70–99)
HCT VFR BLD AUTO: 35.2 % (ref 35–47)
HGB BLD-MCNC: 11.8 G/DL (ref 11.7–15.7)
LVEF ECHO: NORMAL
MCH RBC QN AUTO: 32 PG (ref 26.5–33)
MCHC RBC AUTO-ENTMCNC: 33.5 G/DL (ref 31.5–36.5)
MCV RBC AUTO: 95 FL (ref 78–100)
PLATELET # BLD AUTO: 195 10E3/UL (ref 150–450)
POTASSIUM SERPL-SCNC: 4 MMOL/L (ref 3.4–5.3)
RBC # BLD AUTO: 3.69 10E6/UL (ref 3.8–5.2)
SODIUM SERPL-SCNC: 140 MMOL/L (ref 136–145)
WBC # BLD AUTO: 7.6 10E3/UL (ref 4–11)

## 2023-05-11 PROCEDURE — 250N000013 HC RX MED GY IP 250 OP 250 PS 637: Performed by: PHYSICIAN ASSISTANT

## 2023-05-11 PROCEDURE — G0378 HOSPITAL OBSERVATION PER HR: HCPCS

## 2023-05-11 PROCEDURE — 85027 COMPLETE CBC AUTOMATED: CPT | Performed by: PHYSICIAN ASSISTANT

## 2023-05-11 PROCEDURE — 250N000013 HC RX MED GY IP 250 OP 250 PS 637: Performed by: INTERNAL MEDICINE

## 2023-05-11 PROCEDURE — 96366 THER/PROPH/DIAG IV INF ADDON: CPT

## 2023-05-11 PROCEDURE — 82310 ASSAY OF CALCIUM: CPT | Performed by: PHYSICIAN ASSISTANT

## 2023-05-11 PROCEDURE — 250N000013 HC RX MED GY IP 250 OP 250 PS 637: Performed by: NURSE PRACTITIONER

## 2023-05-11 PROCEDURE — 93306 TTE W/DOPPLER COMPLETE: CPT

## 2023-05-11 PROCEDURE — 99238 HOSP IP/OBS DSCHRG MGMT 30/<: CPT | Performed by: INTERNAL MEDICINE

## 2023-05-11 PROCEDURE — 93306 TTE W/DOPPLER COMPLETE: CPT | Mod: 26 | Performed by: INTERNAL MEDICINE

## 2023-05-11 PROCEDURE — 36415 COLL VENOUS BLD VENIPUNCTURE: CPT | Performed by: PHYSICIAN ASSISTANT

## 2023-05-11 PROCEDURE — 99214 OFFICE O/P EST MOD 30 MIN: CPT | Mod: 25 | Performed by: NURSE PRACTITIONER

## 2023-05-11 RX ORDER — METOPROLOL SUCCINATE 100 MG/1
100 TABLET, EXTENDED RELEASE ORAL 2 TIMES DAILY
Status: DISCONTINUED | OUTPATIENT
Start: 2023-05-11 | End: 2023-05-11 | Stop reason: HOSPADM

## 2023-05-11 RX ORDER — FUROSEMIDE 20 MG
20 TABLET ORAL DAILY
Qty: 90 TABLET | Refills: 0 | Status: SHIPPED | OUTPATIENT
Start: 2023-05-11 | End: 2024-05-09

## 2023-05-11 RX ORDER — METOPROLOL SUCCINATE 100 MG/1
100 TABLET, EXTENDED RELEASE ORAL 2 TIMES DAILY
Qty: 180 TABLET | Refills: 0 | Status: SHIPPED | OUTPATIENT
Start: 2023-05-11 | End: 2023-12-15

## 2023-05-11 RX ORDER — METOPROLOL SUCCINATE 100 MG/1
100 TABLET, EXTENDED RELEASE ORAL DAILY
Qty: 30 TABLET | Refills: 1 | Status: SHIPPED | OUTPATIENT
Start: 2023-05-12 | End: 2023-05-11

## 2023-05-11 RX ADMIN — ASPIRIN 81 MG: 81 TABLET, COATED ORAL at 08:44

## 2023-05-11 RX ADMIN — LISINOPRIL 10 MG: 10 TABLET ORAL at 08:33

## 2023-05-11 RX ADMIN — APIXABAN 5 MG: 5 TABLET, FILM COATED ORAL at 08:33

## 2023-05-11 RX ADMIN — METOPROLOL SUCCINATE 100 MG: 100 TABLET, EXTENDED RELEASE ORAL at 08:33

## 2023-05-11 ASSESSMENT — ACTIVITIES OF DAILY LIVING (ADL)
ADLS_ACUITY_SCORE: 31
ADLS_ACUITY_SCORE: 31
ADLS_ACUITY_SCORE: 35
ADLS_ACUITY_SCORE: 31
ADLS_ACUITY_SCORE: 35

## 2023-05-11 NOTE — PROGRESS NOTES
Brief Cardiology Note  Pt: Alysha Hurst    1950      Echo findings show mildly reduced LV function with EF 45-50%, elevated RVSP, moderate to severe MR, and moderate TR. She appears euvolemic on exam. She remains in atrial fibrillation with HR 80-90's. Will increase Toprol XL to 100 mg BID. In addition, will add lasix 20 mg daily. Follow-up with cardiology in 1-2 weeks with repeat BMP. Okay to discharge from a cardiology standpoint.     Magalys De Santiago CNP  12:41 PM 2023   [facility]  Pager: [phone]

## 2023-05-11 NOTE — PROGRESS NOTES
A/Ox4. Up independent in room. VSS on RA. Tele Afib CVR. Dilt gtt d/c'd this am, transitioned to oral metoprolol. Denies pain. Discharging home today. Meds up from pharmacy sent with patient. AVS and education gone over with pt and . Pt refused zio patch upon discharge - notified cardiology.

## 2023-05-11 NOTE — PLAN OF CARE
No changes overnight. All VSS on RA. Diltiazem drip effective, heart rate 60-80s. Denies pain. Up independently in room. Cardiology following, possible discharge later today or tomorrow if remains stable.

## 2023-05-11 NOTE — DISCHARGE SUMMARY
[facility]    Discharge Summary  Hospitalist    Date of Admission:  5/10/2023  Date of Discharge:  5/11/2023  Discharging Provider: Pankaj Alcocer MD    Discharge Diagnoses      Atrial fibrillation with RVR, new diagnosis   Troponin elevation is likely due to demand ischemia from above  ?  Tachycardia induced cardiomyopathy  Moderate to severe mitral regurgitation  Moderate tricuspid regurgitation  Mild to moderate pulmonary hypertension  Hypertension  Hyperlipidemia  CKD, stage 3    Hospital Course:    Alysha Hurst is a 73 year old female with a past medical history of hypertension, cardiomyopathy (resolved), and hyperlipidemia who presented to the emergency department from the eye clinic due to finding of atrial fibrillation with RVR.       Atrial fibrillation with RVR, new diagnosis   Elevated troponin most likely due to demand ischemia from above  ?  Tachycardia induced cardiomyopathy  Moderate to severe mitral regurgitation  Moderate tricuspid regurgitation  Patient denies any known history of A-fib.  She denies any recent chest pain, palpitations, shortness of breath, leg swelling, or other recent illness.  *EKG shows A-fib with RVR, RBBB, no overt ischemic changes.  *CXR with bilateral ectasis, no other significant abnormalities  *High-sensitivity troponin mildly elevated 39 likely demand ischemia.  Potassium 4.1, creatinine 1.3.  No leukocytosis or anemia.  -CHADSVASc score of 4  -Hemodynamically stable   -Yumi was started on diltiazem infusion.  Which was discontinued.  She was switched to oral metoprolol.  -Heart rate controlled, evaluated by cardiology, recommended Toprol- mg twice a day  -Echocardiogram performed on the day of discharge showed moderately dilated left ventricle with EF of 45-50%, mild to moderate pulmonary hypertension, moderate to severe mitral regurgitation and moderate TR  -Cardiology recommended Eliquis 5 mg twice a day  -Given slight  decrease in EF cardiology also recommended Lasix 20 mg p.o. daily.  Continue prior to admission lisinopril 10 mg daily.  Her drop in EF could be related to tachycardia.  Outpatient follow-up with cardiology     Hypertension  Hyperlipidemia  -Continue PTA lisinopril 10 mg daily with addition of Toprol-XL as mentioned above  FEN continue prior to admission Lipitor     Hx cardiomyopathy  Patient reportedly had a history of cardiomyopathy in 2000 and saw Dr. Sanders of cardiology.  Subsequently she appeared to have normalization of EF with last echo on Care Everywhere from 2012 did reveal normal EF.     CKD, stage 3  Creatinine 1.3.  Previous baseline in Care Everywhere appears to be 1.2-1.3.     Pankaj Alcocer MD, MD    Significant Results and Procedures   See below  Pending Results     Unresulted Labs Ordered in the Past 30 Days of this Admission     No orders found for last 31 day(s).          Code Status   Full Code       Primary Care Physician   Physician No Ref-Primary    Physical Exam   Temp: 97.9  F (36.6  C) Temp src: Oral BP: 125/89 Pulse: 83   Resp: 18 SpO2: 96 % O2 Device: None (Room air)      Constitutional: AAOX3, NAD  Respiratory: CTA B/L, Normal WOB  Cardiovascular: Regular, rate controlled, systolic murmur+  GI: Soft, Non- tender, BS- normoactive  Neuro: CN- grossly intact, Motor strength 5/5 on all 4 extremities.     Discharge Disposition   Discharged to home  Condition at discharge: Stable    Consultations This Hospital Stay   CARDIOLOGY IP CONSULT    Time Spent on this Encounter   I, Pankaj Alcocer MD, personally saw the patient today and spent less than or equal to 30 minutes discharging this patient.    Discharge Orders      Follow-Up with Cardiology JELENA      Reason for your hospital stay    Rapid A-fib     Follow-up and recommended labs and tests    Follow up with primary care provider, within a week.  Cardiology in 1 to 2 weeks     Activity    Your activity upon discharge: activity as  tolerated     Diet    Follow this diet upon discharge: Orders Placed This Encounter      Regular Diet Adult     Discharge Medications       Review of your medicines      START taking      Dose / Directions   apixaban ANTICOAGULANT 5 MG tablet  Commonly known as: ELIQUIS  Indication: Atrial Fibrillation Not Caused By A Heart Valve Problem      Dose: 5 mg  Take 1 tablet (5 mg) by mouth 2 times daily  Quantity: 60 tablet  Refills: 1     furosemide 20 MG tablet  Commonly known as: LASIX  Used for: Cardiomyopathy, unspecified type (H)      Dose: 20 mg  Take 1 tablet (20 mg) by mouth daily  Quantity: 90 tablet  Refills: 0     metoprolol succinate  MG 24 hr tablet  Commonly known as: TOPROL XL      Dose: 100 mg  Take 1 tablet (100 mg) by mouth 2 times daily  Quantity: 180 tablet  Refills: 0        CONTINUE these medicines which have NOT CHANGED      Dose / Directions   atorvastatin 10 MG tablet  Commonly known as: LIPITOR      Dose: 10 mg  Take 10 mg by mouth every evening  Refills: 0     CALCIUM PO      Dose: 1 tablet  Take 1 tablet by mouth daily  Refills: 0     lisinopril 10 MG tablet  Commonly known as: ZESTRIL      Dose: 10 mg  Take 10 mg by mouth daily  Refills: 0        STOP taking    aspirin 81 MG EC tablet              Where to get your medicines      These medications were sent to [facility] - [place], [place] - [address] [address]  [address] [address], [facility] [address]    Phone: [phone]     apixaban ANTICOAGULANT 5 MG tablet    furosemide 20 MG tablet    metoprolol succinate  MG 24 hr tablet           Allergies   Allergies   Allergen Reactions     Ciprofloxacin Other (See Comments)     tendonitis     Sulfacetamide      Happened in high school during mono treatment, pt doesn't recall reaction.      Data   Most Recent 3 CBC's:  Recent Labs   Lab Test 05/11/23  0603 05/10/23  0818   WBC 7.6 6.2   HGB 11.8 14.2   MCV 95 97    246      Most Recent 3 BMP's:  Recent Labs    Lab Test 23  0603 05/10/23  0818    139   POTASSIUM 4.0 4.1   CHLORIDE 106 103   CO2 18* 23   BUN 22.2 26.4*   CR 1.23* 1.31*   ANIONGAP 16* 13   CHLOE 9.2 9.6   GLC 89 100*     Most Recent 2 LFT's:No lab results found.  Most Recent INR's and Anticoagulation Dosing History:  Anticoagulation Dose History          View : No data to display.                    Most Recent 3 Troponin's:No lab results found.  Most Recent Cholesterol Panel:No lab results found.  Most Recent 6 Bacteria Isolates From Any Culture (See EPIC Reports for Culture Details):No lab results found.  Most Recent TSH, T4 and A1c Labs:  Recent Labs   Lab Test 05/10/23  1847   TSH 1.07       Results for orders placed or performed during the hospital encounter of 05/10/23   XR Chest 2 Views    Narrative    CHEST TWO VIEWS May 10, 2023 8:37 AM     HISTORY: New onset atrial fibrillation with RVR.    COMPARISON: None available.      Impression    IMPRESSION: PA and lateral views of the chest were obtained. Enlarged  cardiac silhouette. Basilar pulmonary opacities, likely atelectasis.  No significant pleural effusion or pneumothorax. Multilevel  degenerative changes of the spine.    JOAQUINA PASCAL MD         SYSTEM ID:  [id number]   Echocardiogram Complete     Value    LVEF  45-50%    Narrative    [id number]  [id number]  [id number]  [id number]^^REILLY^DANIELLE     [facility]  Echocardiography Laboratory  [address] [place] [address]     Name: FLORY JUDD  MRN: [id number]  : 1950  Study Date: 2023 10:10 AM  Age: 73 yrs  Gender: Female  Patient Location: [facility]  Reason For Study: Atrial Fibrillation  Ordering Physician: REILLY HEWITT  Referring Physician: No Ref-Primary, Physician  Performed By: Tawnya Truong RDCS     BSA: 1.9 m2  Height: 64 in  Weight: 190 lb  HR: 78  BP: 140/98 mmHg  ______________________________________________________________________________  Procedure  Complete  Portable Echo Adult.  ______________________________________________________________________________  Interpretation Summary     The left ventricle is moderately dilated.  The rhythm was atrial fibrillation.  The visual ejection fraction is 45-50%.  Right ventricular systolic pressure is elevated, consistent with mild to  moderate pulmonary hypertension.  There is moderate to mod-severe (2-3+) mitral regurgitation.  There is moderate (2+) tricuspid regurgitation.  There is no comparison study available.  ______________________________________________________________________________  Left Ventricle  The left ventricle is moderately dilated. There is normal left ventricular  wall thickness. The visual ejection fraction is 45-50%. Left ventricular  diastolic function is abnormal.     Right Ventricle  The right ventricle is normal in structure, function and size.     Atria  There is severe biatrial enlargement.     Mitral Valve  There is mild mitral annular calcification. The mitral valve leaflets are  mildly thickened. There is moderate to mod-severe (2-3+) mitral regurgitation.  The mitral regurgitant jet is posteriorly directed, which is consistent with  anterior leaflet pathology.     Tricuspid Valve  Normal tricuspid valve. Right ventricular systolic pressure is elevated,  consistent with mild to moderate pulmonary hypertension. There is moderate  (2+) tricuspid regurgitation.     Aortic Valve  There is trivial trileaflet aortic sclerosis.     Pulmonic Valve  The pulmonic valve is not well seen, but is grossly normal. There is trace  pulmonic valvular regurgitation.     Vessels  The aortic root is normal size. Normal size ascending aorta. Dilation of the  inferior vena cava is present with normal respiratory variation in diameter.     Pericardium  There is no pericardial effusion.     Rhythm  The rhythm was atrial  fibrillation.  ______________________________________________________________________________  MMode/2D Measurements & Calculations  IVSd: 0.84 cm     LVIDd: 6.3 cm  LVIDs: 4.7 cm  LVPWd: 0.88 cm  FS: 25.3 %  LV mass(C)d: 223.6 grams  LV mass(C)dI: 116.8 grams/m2  Ao root diam: 3.4 cm  LA dimension: 5.2 cm  asc Aorta Diam: 3.9 cm  LA/Ao: 1.5  LVOT diam: 1.9 cm  LVOT area: 2.9 cm2  LA Volume (BP): 119.0 ml  LA Volume Index (BP): 62.3 ml/m2  RV Base: 3.7 cm  RWT: 0.28     TAPSE: 1.6 cm     Doppler Measurements & Calculations  MV E max gómez: 95.5 cm/sec  MV A max gómez: 36.8 cm/sec  MV E/A: 2.6  MV dec time: 0.13 sec  LV V1 max P.3 mmHg  LV V1 max: 115.0 cm/sec  LV V1 VTI: 20.4 cm  MR PISA: 3.6 cm2  MR ERO: 0.22 cm2  MR volume: 34.5 ml  SV(LVOT): 58.5 ml  SI(LVOT): 30.5 ml/m2  PA acc time: 0.09 sec  PI end-d gómez: 137.3 cm/sec  TR max gómez: 259.4 cm/sec  TR max P.0 mmHg  E/E' av.9  Lateral E/e': 6.3  Medial E/e': 11.5  RV S Gómez: 9.5 cm/sec     ______________________________________________________________________________  Report approved by: Krystyna Snyder 2023 12:02 PM

## 2023-05-11 NOTE — PROGRESS NOTES
[facility] Cardiology Progress Note  Date of Service: 05/11/2023  Staff Cardiologist: Dr. Cervantes    Assessment & Plan   Alysha Hurst is a 73 year old female admitted on 5/10/2023 with with AFib RVR.    Assessment:   1. Afib RVR  - Off diltiazem gtt, remains in Afib with CVR  - Rate controlled with Toprol  mg daily   - CHADS-Vasc of 3, on apixaban 5 mg BID for stroke prophylaxis     2. Hypertension  - Well controlled on lisinopril 10 mg daily, Toprol  mg daily     3. Hyperlipidemia  - On atorvastatin 10 mg daily     4. Hx of cardiomyopathy in 2000, saw Dr. Sanders, unclear etiology        Plan:  1. Continue Toprol  mg daily.   2. Continue apixaban 5 mg BID.   3. Obtain echocardiogram.   4. 14-day ziopatch at discharge to assess adequacy of rate control.   5. Follow-up with cardiology JELENA in 1-2 weeks.     Magalys De Santiago, CNP  Pager:  [phone]  (7am - 5pm, M-F)    Interval History   No acute events. Remains in Afib with CVR. Hemodynamically stable.    Physical Exam   Temp: 98  F (36.7  C) Temp src: Oral BP: 127/88 Pulse: 72   Resp: 19 SpO2: 94 % O2 Device: None (Room air)    Vitals:    05/11/23 0617   Weight: 87.3 kg (192 lb 8 oz)       Constitutional:   NAD   Skin:   Warm and dry   Head:   Nontraumatic   Neck:   no JVD   Lungs:   normal   Cardiovascular:   irregularly irregular rhythm   Abdomen:   Benign   Extremities and Back:   No edema   Neurological:   Grossly nonfocal       Medications     dilTIAZem Stopped (05/11/23 0844)       apixaban ANTICOAGULANT  5 mg Oral BID     atorvastatin  10 mg Oral QPM     lisinopril  10 mg Oral Daily     metoprolol succinate ER  100 mg Oral Daily     sodium chloride (PF)  3 mL Intracatheter Q8H       Data     Most Recent 3 CBC's:Recent Labs   Lab Test 05/11/23  0603 05/10/23  0818   WBC 7.6 6.2   HGB 11.8 14.2   MCV 95 97    246     Most Recent 3 BMP's:Recent Labs   Lab Test 05/11/23  0603 05/10/23  0818     139   POTASSIUM 4.0 4.1   CHLORIDE 106 103   CO2 18* 23   BUN 22.2 26.4*   CR 1.23* 1.31*   ANIONGAP 16* 13   CHLOE 9.2 9.6   GLC 89 100*     Most Recent 3 Troponin's:No lab results found.      Medical Decision Making       45 MINUTES SPENT BY ME on the date of service doing chart review, history, exam, documentation & further activities per the note.        Clinically Significant Risk Factors Present on Admission                  # Hypertension: home medication list includes antihypertensive(s)

## 2023-05-13 ENCOUNTER — PATIENT OUTREACH (OUTPATIENT)
Dept: CARE COORDINATION | Facility: CLINIC | Age: 73
End: 2023-05-13
Payer: MEDICARE

## 2023-05-13 NOTE — PROGRESS NOTES
Connected Care Resource Center Contact  [facility]/Voicemail     Clinical Data: Transitional Care Management Outreach     Outreach attempted x 2.  Left message on patient's voicemail, providing [facility]'s 24/7 scheduling and nurse triage phone number [phone] ([phone]) for questions/concerns and/or to schedule an appt with an [facility] provider, if they do not have a PCP.      Plan:  [facility] will do no further outreaches at this time.       Sophie Turcios MA  Connected Care Resource Center, [facility]    *Connected Care Resource Team does NOT follow patient ongoing. Referrals are identified based on internal discharge reports and the outreach is to ensure patient has an understanding of their discharge instructions.

## 2023-08-22 ENCOUNTER — OFFICE VISIT (OUTPATIENT)
Dept: CARDIOLOGY | Facility: CLINIC | Age: 73
End: 2023-08-22
Payer: MEDICARE

## 2023-08-22 VITALS
DIASTOLIC BLOOD PRESSURE: 95 MMHG | SYSTOLIC BLOOD PRESSURE: 155 MMHG | BODY MASS INDEX: 32.85 KG/M2 | WEIGHT: 192.4 LBS | HEART RATE: 83 BPM | OXYGEN SATURATION: 99 % | HEIGHT: 64 IN

## 2023-08-22 DIAGNOSIS — I10 BENIGN ESSENTIAL HYPERTENSION: ICD-10-CM

## 2023-08-22 DIAGNOSIS — I48.21 PERMANENT ATRIAL FIBRILLATION (H): Primary | ICD-10-CM

## 2023-08-22 DIAGNOSIS — E66.09 CLASS 1 OBESITY DUE TO EXCESS CALORIES WITHOUT SERIOUS COMORBIDITY WITH BODY MASS INDEX (BMI) OF 33.0 TO 33.9 IN ADULT: ICD-10-CM

## 2023-08-22 DIAGNOSIS — E66.811 CLASS 1 OBESITY DUE TO EXCESS CALORIES WITHOUT SERIOUS COMORBIDITY WITH BODY MASS INDEX (BMI) OF 33.0 TO 33.9 IN ADULT: ICD-10-CM

## 2023-08-22 DIAGNOSIS — I42.9 SECONDARY CARDIOMYOPATHY (H): ICD-10-CM

## 2023-08-22 DIAGNOSIS — E78.00 HYPERCHOLESTEREMIA: ICD-10-CM

## 2023-08-22 PROCEDURE — 93000 ELECTROCARDIOGRAM COMPLETE: CPT | Performed by: INTERNAL MEDICINE

## 2023-08-22 PROCEDURE — 99215 OFFICE O/P EST HI 40 MIN: CPT | Performed by: INTERNAL MEDICINE

## 2023-08-22 NOTE — LETTER
8/22/2023    Zoya Durham, DO, DO  [address] [place] [address]    RE: Alysha Hurst       Dear Colleague,     I had the pleasure of seeing Alysha Hurst in the [facility].  HPI and Plan:   Alysha is a very nice 73-year-old woman who I originally met approximately 20 years ago for an idiopathic cardiomyopathy at which time I have a cath report describing normal coronaries.  I have no other records from that period of time.    She recently has been found to have atrial fibrillation noted as part of preop physical for cataract surgery.  She was completely unaware of her A-fib.  Most of her evaluation was done through the [facility] system which showed a mildly decreased left ventricular function estimate ejection fraction of 45 to 50% initially with moderate to severe mitral digitation and moderate tricuspid regurgitation.  She was initiated on Eliquis.  Also started metoprolol succinate eventually marching up to 100 mg twice a day.  She was cardioverted but this did not last.  A Lexiscan was ordered.  She went online and read about it and became quite anxious.  She decided not to follow through and ultimately she decided to seek me out.    Her family is significant for father having A-fib and ultimately dying at age 82 of a myocardial infarction.  Her mother had several cerebrovascular accidents ultimately dying at 81 also with congestive heart failure.    She is a lifelong non-smoker without diabetes mellitus.  She has hypertension and hyperlipidemia that has been treated over the years.  Review of records show her most recent fasting lipid profile from February showing a total cholesterol 231 with an HDL of 118 and LDL of 97 and triglycerides of 99.  She was on atorvastatin 10.  Recent thyroid function tests were normal.    She also had a CHEY and ultimately a follow-up transthoracic echocardiogram most recently in July showing ejection fraction improving to 50%, her mitral  regurgitation improving to mild to moderate and tricuspid regurgitation also improving to mild to moderate.  Pulmonary pressures estimated to be 31 mmHg plus right to pressure.    EKG today demonstrates atrial fibrillation with a controlled ventricular response.  We will continue with her metoprolol succinate 100 mg twice a day.  I told her we can change her to 200 mg once a day when she runs out of her current pills.    She has no chest arm neck jaw or shoulder discomfort.  She notes no limitations to her activity.  No lightheadedness dizziness syncope or near syncope.  No bleeding problems with Eliquis therapy.  No symptoms to suggest TIA or CVA.  She does not snore at nighttime nor does she have daytime somnolence.    Assessment and plan.  She has no symptoms to suggest ischemia we talked about her Lexiscan and at this time we decided we will proceed with a calcium score to see how high risk she is.  She is willing to consider a Lexiscan after I explained it to her.  She states she cannot exercise because of ankle problems.    Cardiomyopathy I suspect this primarily due to to her A-fib.  Although she also had a cardiomyopathy 20 years ago of unclear etiology I have no records from then other than her cath report.  I will follow this up in 3 months with a repeat echocardiogram.    Blood pressure is high today which may be contributing factor.  Although she has many other blood pressures that appear to be in the normal range.  I have told him to check on this periodically if it is high we need to treat this more aggressively.  Although as stated most of her blood pressures are in the 120s.  She states she is quite stressed out as they have trouble finding the building today.    We will see what her calcium score is and decide if we need to be more aggressive with her cholesterol management.    Talked about the importance of regular exercise.    She is okay to proceed with her cataract surgery.    Thank you for  allow me to participate in this patient's care.  Sincerely,                               Ab Sanders MD [facility]    Today's clinic visit entailed:  Review of external notes as documented elsewhere in note  Review of the result(s) of each unique test - EKG, echocardiogram, CHEY, lab work, coronary angiogram  Ordering of each unique test  Prescription drug management  45 minutes spent by me on the date of the encounter doing chart review, history and exam, documentation and further activities per the note  Provider  Link to [facility] Help Grid     The level of medical decision making during this visit was of moderate complexity.      Orders Placed This Encounter   Procedures    Basic metabolic panel    Lipid Profile    ALT    Follow-Up with Cardiology    EKG 12-lead complete w/read - Clinics (performed today)    Echocardiogram Complete       No orders of the defined types were placed in this encounter.      There are no discontinued medications.      Encounter Diagnoses   Name Primary?    Permanent atrial fibrillation (H) Yes    Secondary cardiomyopathy (H)     Hypercholesteremia     Benign essential hypertension     Class 1 obesity due to excess calories without serious comorbidity with body mass index (BMI) of 33.0 to 33.9 in adult        CURRENT MEDICATIONS:  Current Outpatient Medications   Medication Sig Dispense Refill    apixaban ANTICOAGULANT (ELIQUIS) 5 MG tablet Take 1 tablet (5 mg) by mouth 2 times daily 60 tablet 1    atorvastatin (LIPITOR) 10 MG tablet Take 10 mg by mouth every evening      CALCIUM PO Take 1 tablet by mouth daily      furosemide (LASIX) 20 MG tablet Take 1 tablet (20 mg) by mouth daily 90 tablet 0    lisinopril (ZESTRIL) 10 MG tablet Take 10 mg by mouth daily      metoprolol succinate ER (TOPROL XL) 100 MG 24 hr tablet Take 1 tablet (100 mg) by mouth 2 times daily 180 tablet 0       ALLERGIES     Allergies   Allergen Reactions    Ciprofloxacin Other (See Comments)     tendonitis     "Sulfacetamide      Happened in high school during mono treatment, pt doesn't recall reaction.        PAST MEDICAL HISTORY:  No past medical history on file.    PAST SURGICAL HISTORY:  No past surgical history on file.    FAMILY HISTORY:  No family history on file.    SOCIAL HISTORY:  Social History     Socioeconomic History    Marital status:      Spouse name: None    Number of children: None    Years of education: None    Highest education level: None   Tobacco Use    Smoking status: Never    Smokeless tobacco: Never       Review of Systems:  Skin:  Negative       Eyes:  Positive for glasses    ENT:  Negative      Respiratory:  Negative       Cardiovascular:  Negative;palpitations;chest pain;edema;syncope or near-syncope;fatigue;lightheadedness;dizziness exercise intolerance;Positive for;fatigue    Gastroenterology: Negative      Genitourinary:  Negative      Musculoskeletal:  Positive for arthritis    Neurologic:  Negative      Psychiatric:  Negative      Heme/Lymph/Imm:  Negative      Endocrine:  Negative        Physical Exam:  Vitals: BP (!) 155/95   Pulse 83   Ht 1.626 m (5' 4\")   Wt 87.3 kg (192 lb 6.4 oz)   SpO2 99%   BMI 33.03 kg/m      Constitutional:  cooperative, alert and oriented, well developed, well nourished, in no acute distress obese      Skin:  warm and dry to the touch, no apparent skin lesions or masses noted          Head:  normocephalic, no masses or lesions        Eyes:  pupils equal and round, conjunctivae and lids unremarkable, sclera white, no xanthalasma, EOMS intact, no nystagmus        Lymph:      ENT:  no pallor or cyanosis, dentition good        Neck:  no carotid bruit        Respiratory:  normal breath sounds, clear to auscultation, normal A-P diameter, normal symmetry, normal respiratory excursion, no use of accessory muscles         Cardiac: regular rhythm;no murmurs, gallops or rubs detected                pulses full and equal                                    "     GI:           Extremities and Muscular Skeletal:  no edema;no spinal abnormalities noted;normal muscle strength and tone              Neurological:  no gross motor deficits        Psych:  affect appropriate, oriented to time, person and place        CC  Referred Self,         Thank you for allowing me to participate in the care of your patient.      Sincerely,     Ab Sanders MD     [facility]

## 2023-08-22 NOTE — PROGRESS NOTES
HPI and Plan:   Alysha is a very nice 73-year-old woman who I originally met approximately 20 years ago for an idiopathic cardiomyopathy at which time I have a cath report describing normal coronaries.  I have no other records from that period of time.    She recently has been found to have atrial fibrillation noted as part of preop physical for cataract surgery.  She was completely unaware of her A-fib.  Most of her evaluation was done through the [facility] system which showed a mildly decreased left ventricular function estimate ejection fraction of 45 to 50% initially with moderate to severe mitral digitation and moderate tricuspid regurgitation.  She was initiated on Eliquis.  Also started metoprolol succinate eventually marching up to 100 mg twice a day.  She was cardioverted but this did not last.  A Lexiscan was ordered.  She went online and read about it and became quite anxious.  She decided not to follow through and ultimately she decided to seek me out.    Her family is significant for father having A-fib and ultimately dying at age 82 of a myocardial infarction.  Her mother had several cerebrovascular accidents ultimately dying at 81 also with congestive heart failure.    She is a lifelong non-smoker without diabetes mellitus.  She has hypertension and hyperlipidemia that has been treated over the years.  Review of records show her most recent fasting lipid profile from February showing a total cholesterol 231 with an HDL of 118 and LDL of 97 and triglycerides of 99.  She was on atorvastatin 10.  Recent thyroid function tests were normal.    She also had a CHEY and ultimately a follow-up transthoracic echocardiogram most recently in July showing ejection fraction improving to 50%, her mitral regurgitation improving to mild to moderate and tricuspid regurgitation also improving to mild to moderate.  Pulmonary pressures estimated to be 31 mmHg plus right to pressure.    EKG today demonstrates atrial  fibrillation with a controlled ventricular response.  We will continue with her metoprolol succinate 100 mg twice a day.  I told her we can change her to 200 mg once a day when she runs out of her current pills.    She has no chest arm neck jaw or shoulder discomfort.  She notes no limitations to her activity.  No lightheadedness dizziness syncope or near syncope.  No bleeding problems with Eliquis therapy.  No symptoms to suggest TIA or CVA.  She does not snore at nighttime nor does she have daytime somnolence.    Assessment and plan.  She has no symptoms to suggest ischemia we talked about her Lexiscan and at this time we decided we will proceed with a calcium score to see how high risk she is.  She is willing to consider a Lexiscan after I explained it to her.  She states she cannot exercise because of ankle problems.    Cardiomyopathy I suspect this primarily due to to her A-fib.  Although she also had a cardiomyopathy 20 years ago of unclear etiology I have no records from then other than her cath report.  I will follow this up in 3 months with a repeat echocardiogram.    Blood pressure is high today which may be contributing factor.  Although she has many other blood pressures that appear to be in the normal range.  I have told him to check on this periodically if it is high we need to treat this more aggressively.  Although as stated most of her blood pressures are in the 120s.  She states she is quite stressed out as they have trouble finding the building today.    We will see what her calcium score is and decide if we need to be more aggressive with her cholesterol management.    Talked about the importance of regular exercise.    She is okay to proceed with her cataract surgery.    Thank you for allow me to participate in this patient's care.  Sincerely,                               Ab Sanders MD [facility]    Today's clinic visit entailed:  Review of external notes as documented elsewhere in note  Review  of the result(s) of each unique test - EKG, echocardiogram, CHEY, lab work, coronary angiogram  Ordering of each unique test  Prescription drug management  45 minutes spent by me on the date of the encounter doing chart review, history and exam, documentation and further activities per the note  Provider  Link to [facility] Help Grid     The level of medical decision making during this visit was of moderate complexity.      Orders Placed This Encounter   Procedures    Basic metabolic panel    Lipid Profile    ALT    Follow-Up with Cardiology    EKG 12-lead complete w/read - Clinics (performed today)    Echocardiogram Complete       No orders of the defined types were placed in this encounter.      There are no discontinued medications.      Encounter Diagnoses   Name Primary?    Permanent atrial fibrillation (H) Yes    Secondary cardiomyopathy (H)     Hypercholesteremia     Benign essential hypertension     Class 1 obesity due to excess calories without serious comorbidity with body mass index (BMI) of 33.0 to 33.9 in adult        CURRENT MEDICATIONS:  Current Outpatient Medications   Medication Sig Dispense Refill    apixaban ANTICOAGULANT (ELIQUIS) 5 MG tablet Take 1 tablet (5 mg) by mouth 2 times daily 60 tablet 1    atorvastatin (LIPITOR) 10 MG tablet Take 10 mg by mouth every evening      CALCIUM PO Take 1 tablet by mouth daily      furosemide (LASIX) 20 MG tablet Take 1 tablet (20 mg) by mouth daily 90 tablet 0    lisinopril (ZESTRIL) 10 MG tablet Take 10 mg by mouth daily      metoprolol succinate ER (TOPROL XL) 100 MG 24 hr tablet Take 1 tablet (100 mg) by mouth 2 times daily 180 tablet 0       ALLERGIES     Allergies   Allergen Reactions    Ciprofloxacin Other (See Comments)     tendonitis    Sulfacetamide      Happened in high school during mono treatment, pt doesn't recall reaction.        PAST MEDICAL HISTORY:  No past medical history on file.    PAST SURGICAL HISTORY:  No past surgical history on  "file.    FAMILY HISTORY:  No family history on file.    SOCIAL HISTORY:  Social History     Socioeconomic History    Marital status:      Spouse name: None    Number of children: None    Years of education: None    Highest education level: None   Tobacco Use    Smoking status: Never    Smokeless tobacco: Never       Review of Systems:  Skin:  Negative       Eyes:  Positive for glasses    ENT:  Negative      Respiratory:  Negative       Cardiovascular:  Negative;palpitations;chest pain;edema;syncope or near-syncope;fatigue;lightheadedness;dizziness exercise intolerance;Positive for;fatigue    Gastroenterology: Negative      Genitourinary:  Negative      Musculoskeletal:  Positive for arthritis    Neurologic:  Negative      Psychiatric:  Negative      Heme/Lymph/Imm:  Negative      Endocrine:  Negative        Physical Exam:  Vitals: BP (!) 155/95   Pulse 83   Ht 1.626 m (5' 4\")   Wt 87.3 kg (192 lb 6.4 oz)   SpO2 99%   BMI 33.03 kg/m      Constitutional:  cooperative, alert and oriented, well developed, well nourished, in no acute distress obese      Skin:  warm and dry to the touch, no apparent skin lesions or masses noted          Head:  normocephalic, no masses or lesions        Eyes:  pupils equal and round, conjunctivae and lids unremarkable, sclera white, no xanthalasma, EOMS intact, no nystagmus        Lymph:      ENT:  no pallor or cyanosis, dentition good        Neck:  no carotid bruit        Respiratory:  normal breath sounds, clear to auscultation, normal A-P diameter, normal symmetry, normal respiratory excursion, no use of accessory muscles         Cardiac: regular rhythm;no murmurs, gallops or rubs detected                pulses full and equal                                        GI:           Extremities and Muscular Skeletal:  no edema;no spinal abnormalities noted;normal muscle strength and tone              Neurological:  no gross motor deficits        Psych:  affect appropriate, " oriented to time, person and place        CC  Referred Self, MD  No address on file

## 2023-08-30 ENCOUNTER — HOSPITAL ENCOUNTER (OUTPATIENT)
Dept: CARDIOLOGY | Facility: CLINIC | Age: 73
Discharge: HOME | End: 2023-08-30
Attending: INTERNAL MEDICINE | Admitting: INTERNAL MEDICINE
Payer: MEDICARE

## 2023-08-30 DIAGNOSIS — I42.9 SECONDARY CARDIOMYOPATHY (H): ICD-10-CM

## 2023-08-30 PROCEDURE — 75571 CT HRT W/O DYE W/CA TEST: CPT | Mod: GA,MG

## 2023-08-30 PROCEDURE — G1010 CDSM STANSON: HCPCS | Performed by: INTERNAL MEDICINE

## 2023-08-30 PROCEDURE — G1010 CDSM STANSON: HCPCS

## 2023-08-30 PROCEDURE — 75571 CT HRT W/O DYE W/CA TEST: CPT | Mod: 26 | Performed by: INTERNAL MEDICINE

## 2023-08-31 ENCOUNTER — TELEPHONE (OUTPATIENT)
Dept: CARDIOLOGY | Facility: CLINIC | Age: 73
End: 2023-08-31
Payer: MEDICARE

## 2023-08-31 DIAGNOSIS — I10 BENIGN ESSENTIAL HYPERTENSION: Primary | ICD-10-CM

## 2023-08-31 DIAGNOSIS — I42.9 SECONDARY CARDIOMYOPATHY (H): ICD-10-CM

## 2023-08-31 DIAGNOSIS — E78.00 HYPERCHOLESTEREMIA: ICD-10-CM

## 2023-08-31 NOTE — TELEPHONE ENCOUNTER
Message left to return call. For results. Nodule on Soft tissue results.     Ca+ score  8-30-23  IMPRESSIONS:  1.  Mild coronary calcifications.  2.  The total Agatston calcium score is 37.7 placing the patient in  the 57th percentile when compared to age and gender matched control  group.   3.  Recommend aggressive risk factor modification.    Soft tissue results -   1.  Nodular focus in the left lower lobe measuring 11 mm is favored to  represent nodular scarring. Consider a follow-up chest CT in 3 months  to ensure stability.  ** Soft tissue results faxed to PCP.   PCP Zoya Durham. DO. [facility].     Last Dr. Sanders OV 8-22-23  We will see what her calcium score is and decide if we need to be more aggressive with her cholesterol management.

## 2023-08-31 NOTE — TELEPHONE ENCOUNTER
Spoke with patient to review the incidental finding of lung nodule. Reviewed this was sent to her PCP provider to assess for any future testing. Patient states all testing is just to make money. She ended the call.

## 2023-09-01 RX ORDER — ATORVASTATIN CALCIUM 20 MG/1
20 TABLET, FILM COATED ORAL DAILY
Qty: 90 TABLET | Refills: 2 | Status: SHIPPED | OUTPATIENT
Start: 2023-09-01 | End: 2024-05-09

## 2023-09-01 NOTE — TELEPHONE ENCOUNTER
Per Dr. Sanders's recommendation Patient called Ca+ score results reviewed.  All questions answered.  Patient agrees with increasing Atorvastatin to 20 mg daily with repeat FLP/ALT in 1 month.  Orders placed.  New Atorvastatin prescription e scibed.    Patient to call with any questions or concerns.     Currently taking Atorvastatin 10 mg daily.     Dr. Sanders's reply -   Calcium score is in the 57th percentile.  LDL is 97.  She is on very low-dose atorvastatin I would consider increasing to 20 mg to drive LDL lower which theoretically would decrease her risk for heart attacks and strokes.  If she does increase her atorvastatin recheck a fasting lipid profile and ALT in 1 month.

## 2023-09-20 ENCOUNTER — TELEPHONE (OUTPATIENT)
Dept: CARDIOLOGY | Facility: CLINIC | Age: 73
End: 2023-09-20
Payer: MEDICARE

## 2023-09-20 NOTE — TELEPHONE ENCOUNTER
[facility] Call Center    Phone Message    May a detailed message be left on voicemail: yes     Reason for Call: Other: Pt would like a call back to discuss her atorvastatin as she was told to take a double dose and she started to notice pain in her toe, then heel , and now in her leg and she is concerned and is not sure if she should continue to take double dose and would like to discuss asap, pt would like a call back to [phone]     Action Taken: Other: Cardio    Travel Screening: Not Applicable

## 2023-09-20 NOTE — TELEPHONE ENCOUNTER
"Reply from Dr. Sanders:  Statin pain is usually all muscles usually flulike predominantly in the largest muscles being thigh and lower back.  I think one-sided leg pain has nothing to do with atorvastatin.  Continue atorvastatin look for other etiologies for her leg pain with her primary care doctor       1640 attempted to contact patient with Dr. Sanders's reply. Left message for patient to call back.    9/21/2023 0840 spoke with patient. She is not convinced that she needs to be on this dose of statin. Reviewed the new LDL goals for heart disease but she states this is not what she was told in the hospital and our information sheets are wrong. She states she will \"decide for herself\" if she is going to take the medication and will discuss at her November visit.    "

## 2023-09-20 NOTE — TELEPHONE ENCOUNTER
Dose was increased from 10mg to 20mg daily on 9/1/23. Pain is only in right leg, started in right toe and then moved up to her heel > calf > back of thigh > glute. She said it is only on her right side. She does have a history of arthritis in her feet. She describes the pain as sharp, and feels like her leg will give out. Symptoms started two days after dosage increase. She denies any circulatory issues or swelling, but notes her foot was red initially when the pain started, now resolved. Dr Sanders messaged.

## 2023-10-02 ENCOUNTER — LAB (OUTPATIENT)
Dept: LAB | Facility: CLINIC | Age: 73
End: 2023-10-02
Payer: MEDICARE

## 2023-10-02 DIAGNOSIS — I10 BENIGN ESSENTIAL HYPERTENSION: ICD-10-CM

## 2023-10-02 DIAGNOSIS — I42.9 SECONDARY CARDIOMYOPATHY (H): ICD-10-CM

## 2023-10-02 DIAGNOSIS — E78.00 HYPERCHOLESTEREMIA: ICD-10-CM

## 2023-10-02 LAB
ALT SERPL W P-5'-P-CCNC: 26 U/L (ref 0–50)
CHOLEST SERPL-MCNC: 169 MG/DL
HDLC SERPL-MCNC: 85 MG/DL
LDLC SERPL CALC-MCNC: 65 MG/DL
NONHDLC SERPL-MCNC: 84 MG/DL
TRIGL SERPL-MCNC: 94 MG/DL

## 2023-10-02 PROCEDURE — 36415 COLL VENOUS BLD VENIPUNCTURE: CPT | Performed by: INTERNAL MEDICINE

## 2023-10-02 PROCEDURE — 80061 LIPID PANEL: CPT | Performed by: INTERNAL MEDICINE

## 2023-10-02 PROCEDURE — 84460 ALANINE AMINO (ALT) (SGPT): CPT | Performed by: INTERNAL MEDICINE

## 2023-10-03 ENCOUNTER — TELEPHONE (OUTPATIENT)
Dept: CARDIOLOGY | Facility: CLINIC | Age: 73
End: 2023-10-03
Payer: MEDICARE

## 2023-11-20 ENCOUNTER — HOSPITAL ENCOUNTER (OUTPATIENT)
Dept: CARDIOLOGY | Facility: CLINIC | Age: 73
Discharge: HOME | End: 2023-11-20
Attending: INTERNAL MEDICINE | Admitting: INTERNAL MEDICINE
Payer: MEDICARE

## 2023-11-20 ENCOUNTER — LAB (OUTPATIENT)
Dept: LAB | Facility: CLINIC | Age: 73
End: 2023-11-20
Payer: MEDICARE

## 2023-11-20 DIAGNOSIS — E78.00 HYPERCHOLESTEREMIA: ICD-10-CM

## 2023-11-20 DIAGNOSIS — I48.21 PERMANENT ATRIAL FIBRILLATION (H): ICD-10-CM

## 2023-11-20 DIAGNOSIS — I42.9 SECONDARY CARDIOMYOPATHY (H): ICD-10-CM

## 2023-11-20 LAB
ALT SERPL W P-5'-P-CCNC: 29 U/L (ref 0–50)
ANION GAP SERPL CALCULATED.3IONS-SCNC: 10 MMOL/L (ref 7–15)
BUN SERPL-MCNC: 27.1 MG/DL (ref 8–23)
CALCIUM SERPL-MCNC: 9.4 MG/DL (ref 8.8–10.2)
CHLORIDE SERPL-SCNC: 102 MMOL/L (ref 98–107)
CHOLEST SERPL-MCNC: 169 MG/DL
CREAT SERPL-MCNC: 1.32 MG/DL (ref 0.51–0.95)
DEPRECATED HCO3 PLAS-SCNC: 24 MMOL/L (ref 22–29)
EGFRCR SERPLBLD CKD-EPI 2021: 43 ML/MIN/1.73M2
GLUCOSE SERPL-MCNC: 102 MG/DL (ref 70–99)
HDLC SERPL-MCNC: 87 MG/DL
LDLC SERPL CALC-MCNC: 64 MG/DL
LVEF ECHO: NORMAL
NONHDLC SERPL-MCNC: 82 MG/DL
POTASSIUM SERPL-SCNC: 4.4 MMOL/L (ref 3.4–5.3)
SODIUM SERPL-SCNC: 136 MMOL/L (ref 135–145)
TRIGL SERPL-MCNC: 88 MG/DL

## 2023-11-20 PROCEDURE — 36415 COLL VENOUS BLD VENIPUNCTURE: CPT | Performed by: INTERNAL MEDICINE

## 2023-11-20 PROCEDURE — 84460 ALANINE AMINO (ALT) (SGPT): CPT | Performed by: INTERNAL MEDICINE

## 2023-11-20 PROCEDURE — 80048 BASIC METABOLIC PNL TOTAL CA: CPT | Performed by: INTERNAL MEDICINE

## 2023-11-20 PROCEDURE — 93306 TTE W/DOPPLER COMPLETE: CPT | Mod: 26 | Performed by: INTERNAL MEDICINE

## 2023-11-20 PROCEDURE — 80061 LIPID PANEL: CPT | Performed by: INTERNAL MEDICINE

## 2023-11-20 PROCEDURE — 93306 TTE W/DOPPLER COMPLETE: CPT

## 2023-11-22 ENCOUNTER — OFFICE VISIT (OUTPATIENT)
Dept: CARDIOLOGY | Facility: CLINIC | Age: 73
End: 2023-11-22
Attending: INTERNAL MEDICINE
Payer: MEDICARE

## 2023-11-22 VITALS
HEIGHT: 64 IN | DIASTOLIC BLOOD PRESSURE: 98 MMHG | WEIGHT: 196 LBS | SYSTOLIC BLOOD PRESSURE: 144 MMHG | BODY MASS INDEX: 33.46 KG/M2 | OXYGEN SATURATION: 99 % | HEART RATE: 88 BPM

## 2023-11-22 DIAGNOSIS — E78.00 HYPERCHOLESTEREMIA: ICD-10-CM

## 2023-11-22 DIAGNOSIS — Z79.01 CHRONIC ANTICOAGULATION: ICD-10-CM

## 2023-11-22 DIAGNOSIS — I48.21 PERMANENT ATRIAL FIBRILLATION (H): ICD-10-CM

## 2023-11-22 DIAGNOSIS — E66.09 CLASS 1 OBESITY DUE TO EXCESS CALORIES WITHOUT SERIOUS COMORBIDITY WITH BODY MASS INDEX (BMI) OF 33.0 TO 33.9 IN ADULT: ICD-10-CM

## 2023-11-22 DIAGNOSIS — E66.811 CLASS 1 OBESITY DUE TO EXCESS CALORIES WITHOUT SERIOUS COMORBIDITY WITH BODY MASS INDEX (BMI) OF 33.0 TO 33.9 IN ADULT: ICD-10-CM

## 2023-11-22 DIAGNOSIS — I10 BENIGN ESSENTIAL HYPERTENSION: ICD-10-CM

## 2023-11-22 DIAGNOSIS — I42.9 SECONDARY CARDIOMYOPATHY (H): Primary | ICD-10-CM

## 2023-11-22 PROCEDURE — 99215 OFFICE O/P EST HI 40 MIN: CPT | Performed by: INTERNAL MEDICINE

## 2023-11-22 RX ORDER — LISINOPRIL 20 MG/1
20 TABLET ORAL DAILY
Qty: 90 TABLET | Refills: 3 | Status: SHIPPED | OUTPATIENT
Start: 2023-11-22 | End: 2023-12-11

## 2023-11-22 NOTE — LETTER
11/22/2023    Zoya Durham, DO, DO  [address] [place] [address]    RE: Alysha Hurst       Dear Colleague,     I had the pleasure of seeing Alysha Hurst in the [facility].  HPI and Plan:   Alysha is a very nice 73-year-old woman who I originally met approximately 20 years ago for an idiopathic cardiomyopathy at which time I have a cath report describing normal coronaries.  I have no other records from that period of time.  At that time she had morbid obesity and has lost she estimates 150 pounds.     I saw her in August when she was found to have atrial fibrillation noted as part of preop physical for cataract surgery.  She was completely unaware of her A-fib.  Most of her evaluation was done through the [facility] system which showed a mildly decreased left ventricular function estimate ejection fraction of 45 to 50% initially with moderate to severe mitral digitation and moderate tricuspid regurgitation.  She was initiated on Eliquis.  Also started metoprolol succinate eventually marching up to 100 mg twice a day.  She was cardioverted but this did not last.  A Lexiscan was ordered.  She went online and read about it and became quite anxious.  She decided not to follow through and ultimately she decided to seek me out.     Her family is significant for father having A-fib and ultimately dying at age 82 of a myocardial infarction.  Her mother had several cerebrovascular accidents ultimately dying at 81 also with congestive heart failure.     She is a lifelong non-smoker without diabetes mellitus.  She has hypertension and hyperlipidemia that has been treated over the years.   Thyroid function tests were normal.     She also had a CHEY and ultimately a follow-up transthoracic echocardiogram most recently in July showing ejection fraction improving to 50%, her mitral regurgitation improving to mild to moderate and tricuspid regurgitation also improving to mild to moderate.  Pulmonary  pressures estimated to be 31 mmHg plus right to pressure.     We continued with her metoprolol succinate 100 mg twice a day.  I told her we can change her to 200 mg once a day when she runs out of her current pills.     She has no chest arm neck jaw or shoulder discomfort.  She notes no limitations to her activity.  No lightheadedness dizziness syncope or near syncope.  No bleeding problems with Eliquis therapy.  No symptoms to suggest TIA or CVA.  She does not snore at nighttime nor does she have daytime somnolence.    I did a calcium score that came back at 37.7 putting her in the 57th percentile.    Alysha returns to clinic and states she thinks she is doing great.  She has no bleeding problems on her Eliquis therapy.  She states she is completely unaware of her heart and notes no limitations.  She notes no side effects or problems with her current medical regiment     Assessment and plan.  She has no symptoms to suggest ischemia.  I do not think we need to do any stress testing at this time.  Will focus on lifestyle and risk factor modification..     Cardiomyopathy I suspect this primarily due to to her A-fib.  Although she also had a cardiomyopathy 20 years ago of unclear etiology I have no records from then other than her cath report.  Echocardiogram shows ejection fraction of 50 to 55%.  Left radicles mildly dilated.  There is mild to moderate mitral regurgitation.  Ascending aorta is 4.2 cm.  Again we will continue with guideline directed medical therapy and risk factor modification     Blood pressure is high today which may be contributing factor.  I will increase her lisinopril to 20 mg daily.  I will follow-up with my nurse for a blood pressure check in 2 weeks with a BMP.     Given her calcium score in the 57th percentile I think an LDL of 64 is adequate and will not intensify her medical regiment at this time.     Talked about the importance of regular exercise.  I have also encouraged her to try to lose  more weight because she is still in the obese category with a body mass index of 33.     In addition to her blood pressure check and lab work in 2 weeks I will have her see my JELENA in 6 months.  I will see her back in a year.  If she should have any problems I be glad to see her sooner.  Thank you for allow me to participate in this patient's care.  Sincerely,                                Ab Sanders MD [facility]    Today's clinic visit entailed:  Review of the result(s) of each unique test - echocardiogram, lab work  Ordering of each unique test  Prescription drug management  42 minutes spent by me on the date of the encounter doing chart review, history and exam, documentation and further activities per the note  Provider  Link to MDM Help Grid     The level of medical decision making during this visit was of moderate complexity.      Orders Placed This Encounter   Procedures    Basic metabolic panel    Lipid Profile    ALT    Basic metabolic panel    Basic metabolic panel    Follow-Up with Cardiology    Follow-Up with Cardiology JELENA    Follow-Up with Cardiology Nurse    Echocardiogram Complete       Orders Placed This Encounter   Medications    lisinopril (ZESTRIL) 20 MG tablet     Sig: Take 1 tablet (20 mg) by mouth daily     Dispense:  90 tablet     Refill:  3       Medications Discontinued During This Encounter   Medication Reason    lisinopril (ZESTRIL) 10 MG tablet          Encounter Diagnoses   Name Primary?    Permanent atrial fibrillation (H)     Secondary cardiomyopathy (H) Yes    Benign essential hypertension     Hypercholesteremia     Chronic anticoagulation     Class 1 obesity due to excess calories without serious comorbidity with body mass index (BMI) of 33.0 to 33.9 in adult        CURRENT MEDICATIONS:  Current Outpatient Medications   Medication Sig Dispense Refill    apixaban ANTICOAGULANT (ELIQUIS) 5 MG tablet Take 1 tablet (5 mg) by mouth 2 times daily 60 tablet 1    atorvastatin (LIPITOR) 20  "MG tablet Take 1 tablet (20 mg) by mouth daily 90 tablet 2    CALCIUM PO Take 1 tablet by mouth daily      furosemide (LASIX) 20 MG tablet Take 1 tablet (20 mg) by mouth daily 90 tablet 0    lisinopril (ZESTRIL) 20 MG tablet Take 1 tablet (20 mg) by mouth daily 90 tablet 3    metoprolol succinate ER (TOPROL XL) 100 MG 24 hr tablet Take 1 tablet (100 mg) by mouth 2 times daily 180 tablet 0       ALLERGIES     Allergies   Allergen Reactions    Ciprofloxacin Other (See Comments)     tendonitis    Sulfacetamide      Happened in high school during mono treatment, pt doesn't recall reaction.        PAST MEDICAL HISTORY:  History reviewed. No pertinent past medical history.    PAST SURGICAL HISTORY:  History reviewed. No pertinent surgical history.    FAMILY HISTORY:  History reviewed. No pertinent family history.    SOCIAL HISTORY:  Social History     Socioeconomic History    Marital status:      Spouse name: None    Number of children: None    Years of education: None    Highest education level: None   Tobacco Use    Smoking status: Never    Smokeless tobacco: Never   Substance and Sexual Activity    Alcohol use: Yes     Comment: 1 glass daily    Drug use: Never       Review of Systems:  Skin:  Negative       Eyes:  Negative      ENT:  Positive for nasal congestion;sinus trouble    Respiratory:  Negative       Cardiovascular:  Negative for;chest pain;palpitations;lightheadedness;dizziness;edema;syncope or near-syncope;fatigue      Gastroenterology: Negative      Genitourinary:  Negative      Musculoskeletal:  Positive for arthritis;joint pain    Neurologic:  Negative      Psychiatric:  Negative      Heme/Lymph/Imm:  Negative      Endocrine:  Negative        Physical Exam:  Vitals: BP (!) 144/98   Pulse 88   Ht 1.626 m (5' 4\")   Wt 88.9 kg (196 lb)   SpO2 99%   BMI 33.64 kg/m      Constitutional:  cooperative, alert and oriented, well developed, well nourished, in no acute distress obese      Skin:  warm and " dry to the touch, no apparent skin lesions or masses noted          Head:  normocephalic, no masses or lesions        Eyes:  pupils equal and round, conjunctivae and lids unremarkable, sclera white, no xanthalasma, EOMS intact, no nystagmus        Lymph:      ENT:  no pallor or cyanosis, dentition good        Neck:  no carotid bruit        Respiratory:  normal breath sounds, clear to auscultation, normal A-P diameter, normal symmetry, normal respiratory excursion, no use of accessory muscles         Cardiac: no murmurs, gallops or rubs detected irregularly irregular rhythm              pulses full and equal                                        GI:           Extremities and Muscular Skeletal:  no edema;no spinal abnormalities noted;normal muscle strength and tone   varicose vein          Neurological:  no gross motor deficits        Psych:  affect appropriate, oriented to time, person and place        CC  Ab Sanders MD  [address]  [place] [address]    Thank you for allowing me to participate in the care of your patient.      Sincerely,     Ab Sanders MD     [facility]

## 2023-11-22 NOTE — PROGRESS NOTES
HPI and Plan:   Alysha is a very nice 73-year-old woman who I originally met approximately 20 years ago for an idiopathic cardiomyopathy at which time I have a cath report describing normal coronaries.  I have no other records from that period of time.  At that time she had morbid obesity and has lost she estimates 150 pounds.     I saw her in August when she was found to have atrial fibrillation noted as part of preop physical for cataract surgery.  She was completely unaware of her A-fib.  Most of her evaluation was done through the [facility] system which showed a mildly decreased left ventricular function estimate ejection fraction of 45 to 50% initially with moderate to severe mitral digitation and moderate tricuspid regurgitation.  She was initiated on Eliquis.  Also started metoprolol succinate eventually marching up to 100 mg twice a day.  She was cardioverted but this did not last.  A Lexiscan was ordered.  She went online and read about it and became quite anxious.  She decided not to follow through and ultimately she decided to seek me out.     Her family is significant for father having A-fib and ultimately dying at age 82 of a myocardial infarction.  Her mother had several cerebrovascular accidents ultimately dying at 81 also with congestive heart failure.     She is a lifelong non-smoker without diabetes mellitus.  She has hypertension and hyperlipidemia that has been treated over the years.   Thyroid function tests were normal.     She also had a CHEY and ultimately a follow-up transthoracic echocardiogram most recently in July showing ejection fraction improving to 50%, her mitral regurgitation improving to mild to moderate and tricuspid regurgitation also improving to mild to moderate.  Pulmonary pressures estimated to be 31 mmHg plus right to pressure.     We continued with her metoprolol succinate 100 mg twice a day.  I told her we can change her to 200 mg once a day when she runs out of her current  pills.     She has no chest arm neck jaw or shoulder discomfort.  She notes no limitations to her activity.  No lightheadedness dizziness syncope or near syncope.  No bleeding problems with Eliquis therapy.  No symptoms to suggest TIA or CVA.  She does not snore at nighttime nor does she have daytime somnolence.    I did a calcium score that came back at 37.7 putting her in the 57th percentile.    Alysha returns to clinic and states she thinks she is doing great.  She has no bleeding problems on her Eliquis therapy.  She states she is completely unaware of her heart and notes no limitations.  She notes no side effects or problems with her current medical regiment     Assessment and plan.  She has no symptoms to suggest ischemia.  I do not think we need to do any stress testing at this time.  Will focus on lifestyle and risk factor modification..     Cardiomyopathy I suspect this primarily due to to her A-fib.  Although she also had a cardiomyopathy 20 years ago of unclear etiology I have no records from then other than her cath report.  Echocardiogram shows ejection fraction of 50 to 55%.  Left radicles mildly dilated.  There is mild to moderate mitral regurgitation.  Ascending aorta is 4.2 cm.  Again we will continue with guideline directed medical therapy and risk factor modification     Blood pressure is high today which may be contributing factor.  I will increase her lisinopril to 20 mg daily.  I will follow-up with my nurse for a blood pressure check in 2 weeks with a BMP.     Given her calcium score in the 57th percentile I think an LDL of 64 is adequate and will not intensify her medical regiment at this time.     Talked about the importance of regular exercise.  I have also encouraged her to try to lose more weight because she is still in the obese category with a body mass index of 33.     In addition to her blood pressure check and lab work in 2 weeks I will have her see my JELENA in 6 months.  I will see her  back in a year.  If she should have any problems I be glad to see her sooner.  Thank you for allow me to participate in this patient's care.  Sincerely,                                Ab Sanders MD [facility]    Today's clinic visit entailed:  Review of the result(s) of each unique test - echocardiogram, lab work  Ordering of each unique test  Prescription drug management  42 minutes spent by me on the date of the encounter doing chart review, history and exam, documentation and further activities per the note  Provider  Link to [facility] Help Grid     The level of medical decision making during this visit was of moderate complexity.      Orders Placed This Encounter   Procedures    Basic metabolic panel    Lipid Profile    ALT    Basic metabolic panel    Basic metabolic panel    Follow-Up with Cardiology    Follow-Up with Cardiology JELENA    Follow-Up with Cardiology Nurse    Echocardiogram Complete       Orders Placed This Encounter   Medications    lisinopril (ZESTRIL) 20 MG tablet     Sig: Take 1 tablet (20 mg) by mouth daily     Dispense:  90 tablet     Refill:  3       Medications Discontinued During This Encounter   Medication Reason    lisinopril (ZESTRIL) 10 MG tablet          Encounter Diagnoses   Name Primary?    Permanent atrial fibrillation (H)     Secondary cardiomyopathy (H) Yes    Benign essential hypertension     Hypercholesteremia     Chronic anticoagulation     Class 1 obesity due to excess calories without serious comorbidity with body mass index (BMI) of 33.0 to 33.9 in adult        CURRENT MEDICATIONS:  Current Outpatient Medications   Medication Sig Dispense Refill    apixaban ANTICOAGULANT (ELIQUIS) 5 MG tablet Take 1 tablet (5 mg) by mouth 2 times daily 60 tablet 1    atorvastatin (LIPITOR) 20 MG tablet Take 1 tablet (20 mg) by mouth daily 90 tablet 2    CALCIUM PO Take 1 tablet by mouth daily      furosemide (LASIX) 20 MG tablet Take 1 tablet (20 mg) by mouth daily 90 tablet 0    lisinopril  "(ZESTRIL) 20 MG tablet Take 1 tablet (20 mg) by mouth daily 90 tablet 3    metoprolol succinate ER (TOPROL XL) 100 MG 24 hr tablet Take 1 tablet (100 mg) by mouth 2 times daily 180 tablet 0       ALLERGIES     Allergies   Allergen Reactions    Ciprofloxacin Other (See Comments)     tendonitis    Sulfacetamide      Happened in high school during mono treatment, pt doesn't recall reaction.        PAST MEDICAL HISTORY:  History reviewed. No pertinent past medical history.    PAST SURGICAL HISTORY:  History reviewed. No pertinent surgical history.    FAMILY HISTORY:  History reviewed. No pertinent family history.    SOCIAL HISTORY:  Social History     Socioeconomic History    Marital status:      Spouse name: None    Number of children: None    Years of education: None    Highest education level: None   Tobacco Use    Smoking status: Never    Smokeless tobacco: Never   Substance and Sexual Activity    Alcohol use: Yes     Comment: 1 glass daily    Drug use: Never       Review of Systems:  Skin:  Negative       Eyes:  Negative      ENT:  Positive for nasal congestion;sinus trouble    Respiratory:  Negative       Cardiovascular:  Negative for;chest pain;palpitations;lightheadedness;dizziness;edema;syncope or near-syncope;fatigue      Gastroenterology: Negative      Genitourinary:  Negative      Musculoskeletal:  Positive for arthritis;joint pain    Neurologic:  Negative      Psychiatric:  Negative      Heme/Lymph/Imm:  Negative      Endocrine:  Negative        Physical Exam:  Vitals: BP (!) 144/98   Pulse 88   Ht 1.626 m (5' 4\")   Wt 88.9 kg (196 lb)   SpO2 99%   BMI 33.64 kg/m      Constitutional:  cooperative, alert and oriented, well developed, well nourished, in no acute distress obese      Skin:  warm and dry to the touch, no apparent skin lesions or masses noted          Head:  normocephalic, no masses or lesions        Eyes:  pupils equal and round, conjunctivae and lids unremarkable, sclera white, no " xanthalasma, EOMS intact, no nystagmus        Lymph:      ENT:  no pallor or cyanosis, dentition good        Neck:  no carotid bruit        Respiratory:  normal breath sounds, clear to auscultation, normal A-P diameter, normal symmetry, normal respiratory excursion, no use of accessory muscles         Cardiac: no murmurs, gallops or rubs detected irregularly irregular rhythm              pulses full and equal                                        GI:           Extremities and Muscular Skeletal:  no edema;no spinal abnormalities noted;normal muscle strength and tone   varicose vein          Neurological:  no gross motor deficits        Psych:  affect appropriate, oriented to time, person and place        CC  Ab Sanders MD  [address]  [place] [id number]

## 2023-12-08 ENCOUNTER — ALLIED HEALTH/NURSE VISIT (OUTPATIENT)
Dept: CARDIOLOGY | Facility: CLINIC | Age: 73
End: 2023-12-08
Attending: INTERNAL MEDICINE
Payer: MEDICARE

## 2023-12-08 ENCOUNTER — LAB (OUTPATIENT)
Dept: LAB | Facility: CLINIC | Age: 73
End: 2023-12-08
Payer: MEDICARE

## 2023-12-08 ENCOUNTER — TELEPHONE (OUTPATIENT)
Dept: CARDIOLOGY | Facility: CLINIC | Age: 73
End: 2023-12-08

## 2023-12-08 VITALS — HEART RATE: 78 BPM | SYSTOLIC BLOOD PRESSURE: 139 MMHG | OXYGEN SATURATION: 97 % | DIASTOLIC BLOOD PRESSURE: 91 MMHG

## 2023-12-08 DIAGNOSIS — E78.00 HYPERCHOLESTEREMIA: ICD-10-CM

## 2023-12-08 DIAGNOSIS — I48.21 PERMANENT ATRIAL FIBRILLATION (H): Primary | ICD-10-CM

## 2023-12-08 DIAGNOSIS — I10 BENIGN ESSENTIAL HYPERTENSION: ICD-10-CM

## 2023-12-08 DIAGNOSIS — I48.21 PERMANENT ATRIAL FIBRILLATION (H): ICD-10-CM

## 2023-12-08 LAB
ANION GAP SERPL CALCULATED.3IONS-SCNC: 11 MMOL/L (ref 7–15)
BUN SERPL-MCNC: 29.7 MG/DL (ref 8–23)
CALCIUM SERPL-MCNC: 9.5 MG/DL (ref 8.8–10.2)
CHLORIDE SERPL-SCNC: 101 MMOL/L (ref 98–107)
CREAT SERPL-MCNC: 1.32 MG/DL (ref 0.51–0.95)
DEPRECATED HCO3 PLAS-SCNC: 26 MMOL/L (ref 22–29)
EGFRCR SERPLBLD CKD-EPI 2021: 43 ML/MIN/1.73M2
GLUCOSE SERPL-MCNC: 88 MG/DL (ref 70–99)
POTASSIUM SERPL-SCNC: 4.2 MMOL/L (ref 3.4–5.3)
SODIUM SERPL-SCNC: 138 MMOL/L (ref 135–145)

## 2023-12-08 PROCEDURE — 36415 COLL VENOUS BLD VENIPUNCTURE: CPT | Performed by: INTERNAL MEDICINE

## 2023-12-08 PROCEDURE — 80048 BASIC METABOLIC PNL TOTAL CA: CPT | Performed by: INTERNAL MEDICINE

## 2023-12-08 PROCEDURE — 99207 PR NO CHARGE NURSE ONLY: CPT | Performed by: INTERNAL MEDICINE

## 2023-12-08 NOTE — TELEPHONE ENCOUNTER
"BMP is still in process, will update Dr Sanders pending completion.     OV note 11/22/23-   \"Blood pressure is high today which may be contributing factor.  I will increase her lisinopril to 20 mg daily.  I will follow-up with my nurse for a blood pressure check in 2 weeks with a BMP.\"     Addendum 1537: BMP now available, routed to Dr Sanders.     Component      Latest Ref Rng 12/8/2023  1:34 PM   Sodium      135 - 145 mmol/L 138    Potassium      3.4 - 5.3 mmol/L 4.2    Chloride      98 - 107 mmol/L 101    Carbon Dioxide (CO2)      22 - 29 mmol/L 26    Anion Gap      7 - 15 mmol/L 11    Urea Nitrogen      8.0 - 23.0 mg/dL 29.7 (H)    Creatinine      0.51 - 0.95 mg/dL 1.32 (H)    GFR Estimate      >60 mL/min/1.73m2 43 (L)    Calcium      8.8 - 10.2 mg/dL 9.5    Glucose      70 - 99 mg/dL 88       "

## 2023-12-08 NOTE — TELEPHONE ENCOUNTER
Last office visit: 23    Previous blood pressure: 144/98     Mm Hg     Previous heart rate: 88     bpm    Time of readin:50 pm    Morning medications were taken at:  8:09 am    Today's blood pressure: 139/91      mm Hg    Today's heart rate:  78     bpm     Ordering Provider: Dr. Sanders    Results sent to team # : 2    Additional comments:

## 2023-12-11 RX ORDER — LISINOPRIL 20 MG/1
40 TABLET ORAL DAILY
COMMUNITY
Start: 2023-12-11 | End: 2024-01-05

## 2023-12-11 NOTE — TELEPHONE ENCOUNTER
Spoke with Patient and Dr. Sanders's reply reviewed.  Patient states she hopes Lisinopril will help.  Patient states she will try increasing the Lisinopril to 40 mg daily and do BMP in 1-2 weeks.    Patient will continue to monitor and record BP's for update.     Order placed.    Scheduling messaged.         Dr. Sanders reply -   Not good enough.  Increase lisinopril to 40 mg daily.  Recheck a BMP in 1 to 2 weeks.

## 2023-12-15 ENCOUNTER — TELEPHONE (OUTPATIENT)
Dept: CARDIOLOGY | Facility: CLINIC | Age: 73
End: 2023-12-15
Payer: MEDICARE

## 2023-12-15 DIAGNOSIS — I48.91 ATRIAL FIBRILLATION WITH RVR (H): ICD-10-CM

## 2023-12-15 RX ORDER — METOPROLOL SUCCINATE 100 MG/1
100 TABLET, EXTENDED RELEASE ORAL 2 TIMES DAILY
Qty: 180 TABLET | Refills: 3 | Status: SHIPPED | OUTPATIENT
Start: 2023-12-15 | End: 2024-05-09

## 2023-12-15 NOTE — TELEPHONE ENCOUNTER
Spoke with patient, refilled toprol XL 100mg BID to [facility]. She prefers taking the BID dosing instead of 200mg daily.

## 2023-12-15 NOTE — TELEPHONE ENCOUNTER
[facility] Call Center    Phone Message    May a detailed message be left on voicemail: yes     Reason for Call: Medication Question or concern regarding medication   Prescription Clarification  Name of Medication: metoprolol  Prescribing Provider: Tommy   Pharmacy: [id number] - [place], [place] - [address] [facility] [address]     What on the order needs clarification? Patient states she is short on meds as her dose was changed. She would like to get 180 pills rather than 135.       Action Taken: Other: cardio    Travel Screening: Not Applicable

## 2023-12-28 ENCOUNTER — TELEPHONE (OUTPATIENT)
Dept: CARDIOLOGY | Facility: CLINIC | Age: 73
End: 2023-12-28

## 2023-12-28 ENCOUNTER — LAB (OUTPATIENT)
Dept: LAB | Facility: CLINIC | Age: 73
End: 2023-12-28
Payer: MEDICARE

## 2023-12-28 DIAGNOSIS — I10 BENIGN ESSENTIAL HYPERTENSION: Primary | ICD-10-CM

## 2023-12-28 DIAGNOSIS — I10 BENIGN ESSENTIAL HYPERTENSION: ICD-10-CM

## 2023-12-28 DIAGNOSIS — I42.9 SECONDARY CARDIOMYOPATHY (H): ICD-10-CM

## 2023-12-28 DIAGNOSIS — I48.21 PERMANENT ATRIAL FIBRILLATION (H): ICD-10-CM

## 2023-12-28 LAB
ANION GAP SERPL CALCULATED.3IONS-SCNC: 8 MMOL/L (ref 7–15)
BUN SERPL-MCNC: 37.8 MG/DL (ref 8–23)
CALCIUM SERPL-MCNC: 9.7 MG/DL (ref 8.8–10.2)
CHLORIDE SERPL-SCNC: 102 MMOL/L (ref 98–107)
CREAT SERPL-MCNC: 1.26 MG/DL (ref 0.51–0.95)
DEPRECATED HCO3 PLAS-SCNC: 28 MMOL/L (ref 22–29)
EGFRCR SERPLBLD CKD-EPI 2021: 45 ML/MIN/1.73M2
GLUCOSE SERPL-MCNC: 93 MG/DL (ref 70–99)
POTASSIUM SERPL-SCNC: 4.4 MMOL/L (ref 3.4–5.3)
SODIUM SERPL-SCNC: 138 MMOL/L (ref 135–145)

## 2023-12-28 PROCEDURE — 80048 BASIC METABOLIC PNL TOTAL CA: CPT | Performed by: INTERNAL MEDICINE

## 2023-12-28 PROCEDURE — 36415 COLL VENOUS BLD VENIPUNCTURE: CPT | Performed by: INTERNAL MEDICINE

## 2023-12-28 NOTE — TELEPHONE ENCOUNTER
BMP 12-28-23 -   Na 138, K 4.4, BUN 37.8,  Creat 1.26, GFR 45.     Spoke with Patient who states she is tolerating the Lisinopril.   Patient has not checked any BP's. recently.      Phone note 12-11-23 Lisinopril increased to 40 mg daily with repeat BMP in 2 weeks.     Next Dr. Sanders OV 5-9-24 with labs prior.     Last Dr. Sanders OV  11-22-23.

## 2023-12-29 NOTE — TELEPHONE ENCOUNTER
Spoke with Patient who states she does not have a BP machine at home, and does not know if any friends have one.  Patient states she would like to come into Clinic for In Clinic BP check.    Order placed.  Patient will call scheduling next week to arrange if scheduling has not called her.  Patient agrees with plan.     Dr. Sanders's reply -   Labs okay but what his blood pressure?  If she has no way of checking herself have her come in for a nurse blood pressure check

## 2024-01-04 ENCOUNTER — ALLIED HEALTH/NURSE VISIT (OUTPATIENT)
Dept: CARDIOLOGY | Facility: CLINIC | Age: 74
End: 2024-01-04
Payer: MEDICARE

## 2024-01-04 ENCOUNTER — TELEPHONE (OUTPATIENT)
Dept: CARDIOLOGY | Facility: CLINIC | Age: 74
End: 2024-01-04

## 2024-01-04 VITALS — SYSTOLIC BLOOD PRESSURE: 128 MMHG | DIASTOLIC BLOOD PRESSURE: 88 MMHG | HEART RATE: 76 BPM

## 2024-01-04 DIAGNOSIS — I10 BENIGN ESSENTIAL HYPERTENSION: ICD-10-CM

## 2024-01-04 DIAGNOSIS — I42.9 SECONDARY CARDIOMYOPATHY (H): ICD-10-CM

## 2024-01-04 PROCEDURE — 99207 PR NO CHARGE NURSE ONLY: CPT | Performed by: INTERNAL MEDICINE

## 2024-01-04 NOTE — CONFIDENTIAL NOTE
Last office visit: 12/8/23    Previous blood pressure: 139/91  Mm Hg     Previous heart rate: 78  bpm    Morning medications were taken at: 8:06 am    Today's blood pressure:   128/88 mm Hg    Today's heart rate:   76 bpm     Ordering Provider:  Dr. Sanders    Results sent to team # :2    WENDY Alejo

## 2024-01-04 NOTE — TELEPHONE ENCOUNTER
Reply from Dr. Sanders:  BP is okay.  Continue current plan.     Attempted to contact patient with update, left a message with update but will call again tomorrow to verify she was contacted.

## 2024-01-04 NOTE — TELEPHONE ENCOUNTER
Patient seen in clinic today for BP check - she does not have a cuff at home. Patient has been titrating meds: lisinopril changed to 40mg daily as of 12/11/2023.    BP today: 128/88 HR 76 BPM    Patient has been resistant to adding anything other than the minimum dose of medications. In the past has declined stress testing and increasing her statin level.    Will message Dr. Sanders to review

## 2024-01-05 DIAGNOSIS — I10 BENIGN ESSENTIAL HYPERTENSION: ICD-10-CM

## 2024-01-05 RX ORDER — LISINOPRIL 40 MG/1
40 TABLET ORAL DAILY
Qty: 90 TABLET | Refills: 3 | Status: SHIPPED | OUTPATIENT
Start: 2024-01-05 | End: 2024-05-09

## 2024-01-05 RX ORDER — LISINOPRIL 20 MG/1
40 TABLET ORAL DAILY
Qty: 180 TABLET | Refills: 1 | Status: SHIPPED | OUTPATIENT
Start: 2024-01-05 | End: 2024-01-05

## 2024-01-05 NOTE — TELEPHONE ENCOUNTER
Spoke to patient and reviewed Dr Sanders's recommendation to continue with her current prescriptions. She asks that the lisinopril be resent for the 40mg tablet. Rx sent. Follow up is in May.

## 2024-05-09 ENCOUNTER — LAB (OUTPATIENT)
Dept: LAB | Facility: CLINIC | Age: 74
End: 2024-05-09
Payer: MEDICARE

## 2024-05-09 ENCOUNTER — OFFICE VISIT (OUTPATIENT)
Dept: CARDIOLOGY | Facility: CLINIC | Age: 74
End: 2024-05-09
Payer: MEDICARE

## 2024-05-09 VITALS
BODY MASS INDEX: 33.46 KG/M2 | HEART RATE: 95 BPM | SYSTOLIC BLOOD PRESSURE: 137 MMHG | OXYGEN SATURATION: 98 % | WEIGHT: 196 LBS | DIASTOLIC BLOOD PRESSURE: 98 MMHG | HEIGHT: 64 IN

## 2024-05-09 DIAGNOSIS — E78.00 HYPERCHOLESTEREMIA: ICD-10-CM

## 2024-05-09 DIAGNOSIS — I42.9 SECONDARY CARDIOMYOPATHY (H): ICD-10-CM

## 2024-05-09 DIAGNOSIS — I48.91 ATRIAL FIBRILLATION WITH RVR (H): ICD-10-CM

## 2024-05-09 DIAGNOSIS — I42.9 CARDIOMYOPATHY, UNSPECIFIED TYPE (H): ICD-10-CM

## 2024-05-09 DIAGNOSIS — I48.21 PERMANENT ATRIAL FIBRILLATION (H): ICD-10-CM

## 2024-05-09 DIAGNOSIS — I10 BENIGN ESSENTIAL HYPERTENSION: ICD-10-CM

## 2024-05-09 LAB
ANION GAP SERPL CALCULATED.3IONS-SCNC: 12 MMOL/L (ref 7–15)
BUN SERPL-MCNC: 33.6 MG/DL (ref 8–23)
CALCIUM SERPL-MCNC: 9.5 MG/DL (ref 8.8–10.2)
CHLORIDE SERPL-SCNC: 100 MMOL/L (ref 98–107)
CREAT SERPL-MCNC: 1.3 MG/DL (ref 0.51–0.95)
DEPRECATED HCO3 PLAS-SCNC: 26 MMOL/L (ref 22–29)
EGFRCR SERPLBLD CKD-EPI 2021: 43 ML/MIN/1.73M2
GLUCOSE SERPL-MCNC: 94 MG/DL (ref 70–99)
POTASSIUM SERPL-SCNC: 4.1 MMOL/L (ref 3.4–5.3)
SODIUM SERPL-SCNC: 138 MMOL/L (ref 135–145)

## 2024-05-09 PROCEDURE — 80048 BASIC METABOLIC PNL TOTAL CA: CPT | Performed by: INTERNAL MEDICINE

## 2024-05-09 PROCEDURE — 36415 COLL VENOUS BLD VENIPUNCTURE: CPT | Performed by: INTERNAL MEDICINE

## 2024-05-09 PROCEDURE — 99214 OFFICE O/P EST MOD 30 MIN: CPT | Performed by: PHYSICIAN ASSISTANT

## 2024-05-09 RX ORDER — ATORVASTATIN CALCIUM 20 MG/1
20 TABLET, FILM COATED ORAL DAILY
Qty: 90 TABLET | Refills: 3 | Status: SHIPPED | OUTPATIENT
Start: 2024-05-09

## 2024-05-09 RX ORDER — METOPROLOL SUCCINATE 100 MG/1
100 TABLET, EXTENDED RELEASE ORAL 2 TIMES DAILY
Qty: 180 TABLET | Refills: 3 | Status: SHIPPED | OUTPATIENT
Start: 2024-05-09

## 2024-05-09 RX ORDER — FUROSEMIDE 20 MG
20 TABLET ORAL DAILY
Qty: 90 TABLET | Refills: 3 | Status: SHIPPED | OUTPATIENT
Start: 2024-05-09

## 2024-05-09 RX ORDER — LISINOPRIL 40 MG/1
40 TABLET ORAL DAILY
Qty: 90 TABLET | Refills: 3 | Status: SHIPPED | OUTPATIENT
Start: 2024-05-09

## 2024-05-09 NOTE — LETTER
5/9/2024    Zoya Durham, DO, DO  [address] [place] [address]    RE: Alysha Hurst       Dear Colleague,     I had the pleasure of seeing Alysha Hurst in the [facility].  Primary Cardiologist: Dr Sanders    Reason for Visit: 6 month follow up with repeat BMP    History of Present Illness:   Alysha is a pleasant 74-year-old female with past medical history notable for remote history of idiopathic cardiomyopathy (coronary angiogram at that time showed normal coronary tree), paroxysmal atrial fibrillation, persistent low normal LVEF felt to be secondary to atrial fibrillation (her cardiomyopathy history ~20 years ago felt to be idiopathic in etiology), hypertension, and hyperlipidemia.    Alysha returns to clinic stating she is doing well. She has no concerns or questions. She does not check her blood pressure regularly. She has lightheadedness occasionally but she has had this all of her life since early childhood.     Assessment and Plan:  Alysha is a pleasant 74-year-old female with past medical history notable for remote history of idiopathic cardiomyopathy (coronary angiogram at that time showed normal coronary tree), paroxysmal atrial fibrillation, persistent low normal LVEF felt to be secondary to atrial fibrillation (her cardiomyopathy history ~20 years ago felt to be idiopathic in etiology), hypertension, and hyperlipidemia.    Alysha is stable from cardiac standpoint. Her BMP is unremarkable. BP is a bit high here. After discussion of how to check her blood pressure again, we finally decided that she will get a blood pressure machine and check it daily at home for 1 week and report back. She will otherwise follow up with us in 6 months with repeat echo, labs, and visit with Dr. Sanders.    This note was completed in part using Dragon voice recognition software. Although reviewed after completion, some word and grammatical errors may occur.    Orders this Visit:  No orders  of the defined types were placed in this encounter.    Orders Placed This Encounter   Medications    metoprolol succinate ER (TOPROL XL) 100 MG 24 hr tablet     Sig: Take 1 tablet (100 mg) by mouth 2 times daily     Dispense:  180 tablet     Refill:  3    lisinopril (ZESTRIL) 40 MG tablet     Sig: Take 1 tablet (40 mg) by mouth daily     Dispense:  90 tablet     Refill:  3    furosemide (LASIX) 20 MG tablet     Sig: Take 1 tablet (20 mg) by mouth daily     Dispense:  90 tablet     Refill:  3    atorvastatin (LIPITOR) 20 MG tablet     Sig: Take 1 tablet (20 mg) by mouth daily     Dispense:  90 tablet     Refill:  3    apixaban ANTICOAGULANT (ELIQUIS) 5 MG tablet     Sig: Take 1 tablet (5 mg) by mouth 2 times daily     Dispense:  180 tablet     Refill:  3     Medications Discontinued During This Encounter   Medication Reason    apixaban ANTICOAGULANT (ELIQUIS) 5 MG tablet Reorder (No AVS)    furosemide (LASIX) 20 MG tablet Reorder (No AVS)    atorvastatin (LIPITOR) 20 MG tablet Reorder (No AVS)    metoprolol succinate ER (TOPROL XL) 100 MG 24 hr tablet Reorder (No AVS)    lisinopril (ZESTRIL) 40 MG tablet Reorder (No AVS)         Encounter Diagnoses   Name Primary?    Permanent atrial fibrillation (H)     Secondary cardiomyopathy (H)     Benign essential hypertension     Atrial fibrillation with RVR (H)     Cardiomyopathy, unspecified type (H)     Hypercholesteremia        CURRENT MEDICATIONS:  Current Outpatient Medications   Medication Sig Dispense Refill    apixaban ANTICOAGULANT (ELIQUIS) 5 MG tablet Take 1 tablet (5 mg) by mouth 2 times daily 180 tablet 3    atorvastatin (LIPITOR) 20 MG tablet Take 1 tablet (20 mg) by mouth daily 90 tablet 3    CALCIUM PO Take 1 tablet by mouth daily      furosemide (LASIX) 20 MG tablet Take 1 tablet (20 mg) by mouth daily 90 tablet 3    lisinopril (ZESTRIL) 40 MG tablet Take 1 tablet (40 mg) by mouth daily 90 tablet 3    metoprolol succinate ER (TOPROL XL) 100 MG 24 hr tablet  "Take 1 tablet (100 mg) by mouth 2 times daily 180 tablet 3       ALLERGIES     Allergies   Allergen Reactions    Ciprofloxacin Other (See Comments)     tendonitis    Sulfacetamide      Happened in high school during mono treatment, pt doesn't recall reaction.        PAST MEDICAL HISTORY:  History reviewed. No pertinent past medical history.    PAST SURGICAL HISTORY:  History reviewed. No pertinent surgical history.    FAMILY HISTORY:  History reviewed. No pertinent family history.    SOCIAL HISTORY:  Social History     Socioeconomic History    Marital status:      Spouse name: None    Number of children: None    Years of education: None    Highest education level: None   Tobacco Use    Smoking status: Never    Smokeless tobacco: Never   Substance and Sexual Activity    Alcohol use: Yes     Comment: 1 glass daily    Drug use: Never     Social Determinants of Health      Received from [employer] [employer]    Financial Resource Strain    Received from [employer], [employer]    Social Connections       Review of Systems:  Skin:  Negative     Eyes:  Negative    ENT:  Positive for nasal congestion;sinus trouble  Respiratory:  Negative    Cardiovascular:  Negative for;chest pain;palpitations;lightheadedness;dizziness;edema;syncope or near-syncope;fatigue    Gastroenterology: Negative    Genitourinary:  Negative    Musculoskeletal:  Positive for arthritis;joint pain  Neurologic:  Negative    Psychiatric:  Negative    Heme/Lymph/Imm:  Negative    Endocrine:  Negative      Physical Exam:  Vitals: BP (!) 137/98   Pulse 95   Ht 1.626 m (5' 4\")   Wt 88.9 kg (196 lb)   SpO2 98%   BMI 33.64 kg/m       GEN:  NAD  NECK: No JVD  C/V:  Regular rate and rhythm, no murmur, rub or gallop.  RESP: Clear to auscultation bilaterally without wheezing, rales, or rhonchi.  GI: Abdomen soft, " "nontender, nondistended. No HSM appreciated.   EXTREM: Trace pitting LE edema.   NEURO: Alert and oriented, cooperative. No obvious focal deficits.   PSYCH: Normal affect.  SKIN: Warm and dry.       Recent Lab Results:  LIPID RESULTS:  Lab Results   Component Value Date    CHOL 169 11/20/2023    HDL 87 11/20/2023    LDL 64 11/20/2023    TRIG 88 11/20/2023       LIVER ENZYME RESULTS:  Lab Results   Component Value Date    ALT 29 11/20/2023       CBC RESULTS:  Lab Results   Component Value Date    WBC 7.6 05/11/2023    RBC 3.69 (L) 05/11/2023    HGB 11.8 05/11/2023    HCT 35.2 05/11/2023    MCV 95 05/11/2023    MCH 32.0 05/11/2023    MCHC 33.5 05/11/2023    RDW 14.0 05/11/2023     05/11/2023       BMP RESULTS:  Lab Results   Component Value Date     05/09/2024    POTASSIUM 4.1 05/09/2024    CHLORIDE 100 05/09/2024    CO2 26 05/09/2024    ANIONGAP 12 05/09/2024    GLC 94 05/09/2024    BUN 33.6 (H) 05/09/2024    CR 1.30 (H) 05/09/2024    GFRESTIMATED 43 (L) 05/09/2024    CHLOE 9.5 05/09/2024        A1C RESULTS:  No results found for: \"A1C\"    INR RESULTS:  No results found for: \"INR\"        Emy Schrader PA-C  May 9, 2024     Thank you for allowing me to participate in the care of your patient.      Sincerely,     Emy Schrader PA-C     [facility]  cc:   Ab Sanders MD  [address]  [place] [id number]      "

## 2024-05-09 NOTE — PATIENT INSTRUCTIONS
Today's Plan:   1) All medications refilled.  2) Blood work shows normal renal function and electrolytes.   3) I recommend you get a blood pressure machine and check your blood pressure daily for 1 week and call us back with numbers.     If you have questions or concerns please call my nurse team at [phone]    Scheduling phone number: [phone]  Reminder: Please bring in all current medications, over the counter supplements and vitamin bottles to your next appointment.    It was a pleasure seeing you today!

## 2024-05-09 NOTE — PROGRESS NOTES
Primary Cardiologist: Dr Sanders    Reason for Visit: 6 month follow up with repeat BMP    History of Present Illness:   Alysha is a pleasant 74-year-old female with past medical history notable for remote history of idiopathic cardiomyopathy (coronary angiogram at that time showed normal coronary tree), paroxysmal atrial fibrillation, persistent low normal LVEF felt to be secondary to atrial fibrillation (her cardiomyopathy history ~20 years ago felt to be idiopathic in etiology), hypertension, and hyperlipidemia.    Alysha returns to clinic stating she is doing well. She has no concerns or questions. She does not check her blood pressure regularly. She has lightheadedness occasionally but she has had this all of her life since early childhood.     Assessment and Plan:  Alysha is a pleasant 74-year-old female with past medical history notable for remote history of idiopathic cardiomyopathy (coronary angiogram at that time showed normal coronary tree), paroxysmal atrial fibrillation, persistent low normal LVEF felt to be secondary to atrial fibrillation (her cardiomyopathy history ~20 years ago felt to be idiopathic in etiology), hypertension, and hyperlipidemia.    Alysha is stable from cardiac standpoint. Her BMP is unremarkable. BP is a bit high here. After discussion of how to check her blood pressure again, we finally decided that she will get a blood pressure machine and check it daily at home for 1 week and report back. She will otherwise follow up with us in 6 months with repeat echo, labs, and visit with Dr. Sanders.    This note was completed in part using Dragon voice recognition software. Although reviewed after completion, some word and grammatical errors may occur.    Orders this Visit:  No orders of the defined types were placed in this encounter.    Orders Placed This Encounter   Medications    metoprolol succinate ER (TOPROL XL) 100 MG 24 hr tablet     Sig: Take 1 tablet (100 mg) by mouth 2 times  daily     Dispense:  180 tablet     Refill:  3    lisinopril (ZESTRIL) 40 MG tablet     Sig: Take 1 tablet (40 mg) by mouth daily     Dispense:  90 tablet     Refill:  3    furosemide (LASIX) 20 MG tablet     Sig: Take 1 tablet (20 mg) by mouth daily     Dispense:  90 tablet     Refill:  3    atorvastatin (LIPITOR) 20 MG tablet     Sig: Take 1 tablet (20 mg) by mouth daily     Dispense:  90 tablet     Refill:  3    apixaban ANTICOAGULANT (ELIQUIS) 5 MG tablet     Sig: Take 1 tablet (5 mg) by mouth 2 times daily     Dispense:  180 tablet     Refill:  3     Medications Discontinued During This Encounter   Medication Reason    apixaban ANTICOAGULANT (ELIQUIS) 5 MG tablet Reorder (No AVS)    furosemide (LASIX) 20 MG tablet Reorder (No AVS)    atorvastatin (LIPITOR) 20 MG tablet Reorder (No AVS)    metoprolol succinate ER (TOPROL XL) 100 MG 24 hr tablet Reorder (No AVS)    lisinopril (ZESTRIL) 40 MG tablet Reorder (No AVS)         Encounter Diagnoses   Name Primary?    Permanent atrial fibrillation (H)     Secondary cardiomyopathy (H)     Benign essential hypertension     Atrial fibrillation with RVR (H)     Cardiomyopathy, unspecified type (H)     Hypercholesteremia        CURRENT MEDICATIONS:  Current Outpatient Medications   Medication Sig Dispense Refill    apixaban ANTICOAGULANT (ELIQUIS) 5 MG tablet Take 1 tablet (5 mg) by mouth 2 times daily 180 tablet 3    atorvastatin (LIPITOR) 20 MG tablet Take 1 tablet (20 mg) by mouth daily 90 tablet 3    CALCIUM PO Take 1 tablet by mouth daily      furosemide (LASIX) 20 MG tablet Take 1 tablet (20 mg) by mouth daily 90 tablet 3    lisinopril (ZESTRIL) 40 MG tablet Take 1 tablet (40 mg) by mouth daily 90 tablet 3    metoprolol succinate ER (TOPROL XL) 100 MG 24 hr tablet Take 1 tablet (100 mg) by mouth 2 times daily 180 tablet 3       ALLERGIES     Allergies   Allergen Reactions    Ciprofloxacin Other (See Comments)     tendonitis    Sulfacetamide      Happened in high school  "during mono treatment, pt doesn't recall reaction.        PAST MEDICAL HISTORY:  History reviewed. No pertinent past medical history.    PAST SURGICAL HISTORY:  History reviewed. No pertinent surgical history.    FAMILY HISTORY:  History reviewed. No pertinent family history.    SOCIAL HISTORY:  Social History     Socioeconomic History    Marital status:      Spouse name: None    Number of children: None    Years of education: None    Highest education level: None   Tobacco Use    Smoking status: Never    Smokeless tobacco: Never   Substance and Sexual Activity    Alcohol use: Yes     Comment: 1 glass daily    Drug use: Never     Social Determinants of Health      [facility]    Financial Resource Strain    Received from [employer], [employer]    Social Connections       Review of Systems:  Skin:  Negative     Eyes:  Negative    ENT:  Positive for nasal congestion;sinus trouble  Respiratory:  Negative    Cardiovascular:  Negative for;chest pain;palpitations;lightheadedness;dizziness;edema;syncope or near-syncope;fatigue    Gastroenterology: Negative    Genitourinary:  Negative    Musculoskeletal:  Positive for arthritis;joint pain  Neurologic:  Negative    Psychiatric:  Negative    Heme/Lymph/Imm:  Negative    Endocrine:  Negative      Physical Exam:  Vitals: BP (!) 137/98   Pulse 95   Ht 1.626 m (5' 4\")   Wt 88.9 kg (196 lb)   SpO2 98%   BMI 33.64 kg/m       GEN:  NAD  NECK: No JVD  C/V:  Regular rate and rhythm, no murmur, rub or gallop.  RESP: Clear to auscultation bilaterally without wheezing, rales, or rhonchi.  GI: Abdomen soft, nontender, nondistended. No HSM appreciated.   EXTREM: Trace pitting LE edema.   NEURO: Alert and oriented, cooperative. No obvious focal deficits.   PSYCH: Normal affect.  SKIN: Warm and dry.       Recent Lab " "Results:  LIPID RESULTS:  Lab Results   Component Value Date    CHOL 169 11/20/2023    HDL 87 11/20/2023    LDL 64 11/20/2023    TRIG 88 11/20/2023       LIVER ENZYME RESULTS:  Lab Results   Component Value Date    ALT 29 11/20/2023       CBC RESULTS:  Lab Results   Component Value Date    WBC 7.6 05/11/2023    RBC 3.69 (L) 05/11/2023    HGB 11.8 05/11/2023    HCT 35.2 05/11/2023    MCV 95 05/11/2023    MCH 32.0 05/11/2023    MCHC 33.5 05/11/2023    RDW 14.0 05/11/2023     05/11/2023       BMP RESULTS:  Lab Results   Component Value Date     05/09/2024    POTASSIUM 4.1 05/09/2024    CHLORIDE 100 05/09/2024    CO2 26 05/09/2024    ANIONGAP 12 05/09/2024    GLC 94 05/09/2024    BUN 33.6 (H) 05/09/2024    CR 1.30 (H) 05/09/2024    GFRESTIMATED 43 (L) 05/09/2024    CHLOE 9.5 05/09/2024        A1C RESULTS:  No results found for: \"A1C\"    INR RESULTS:  No results found for: \"INR\"        Emy Schrader PA-C  May 9, 2024     "

## 2024-06-16 ENCOUNTER — HEALTH MAINTENANCE LETTER (OUTPATIENT)
Age: 74
End: 2024-06-16

## 2025-02-19 DIAGNOSIS — I42.9 SECONDARY CARDIOMYOPATHY (H): Primary | ICD-10-CM

## 2025-02-19 DIAGNOSIS — I10 BENIGN ESSENTIAL HYPERTENSION: ICD-10-CM

## 2025-02-19 DIAGNOSIS — E78.00 HYPERCHOLESTEREMIA: ICD-10-CM

## 2025-02-19 NOTE — PROGRESS NOTES
New orders placed for  labs.    Patient called to schedule procedure.  Patient wants to have procedure done in [place]. Patient was transferred to [phone] to schedule.

## 2025-02-20 ENCOUNTER — DOCUMENTATION ONLY (OUTPATIENT)
Dept: CARDIOLOGY | Facility: CLINIC | Age: 75
End: 2025-02-20

## 2025-02-20 ENCOUNTER — LAB (OUTPATIENT)
Dept: LAB | Facility: CLINIC | Age: 75
End: 2025-02-20
Payer: MEDICARE

## 2025-02-20 DIAGNOSIS — I42.9 SECONDARY CARDIOMYOPATHY (H): ICD-10-CM

## 2025-02-20 DIAGNOSIS — I10 BENIGN ESSENTIAL HYPERTENSION: ICD-10-CM

## 2025-02-20 DIAGNOSIS — E78.00 HYPERCHOLESTEREMIA: ICD-10-CM

## 2025-02-20 LAB
ALT SERPL W P-5'-P-CCNC: 32 U/L (ref 0–50)
ANION GAP SERPL CALCULATED.3IONS-SCNC: 11 MMOL/L (ref 7–15)
BUN SERPL-MCNC: 29.7 MG/DL (ref 8–23)
CALCIUM SERPL-MCNC: 9.8 MG/DL (ref 8.8–10.4)
CHLORIDE SERPL-SCNC: 102 MMOL/L (ref 98–107)
CHOLEST SERPL-MCNC: 174 MG/DL
CREAT SERPL-MCNC: 1.29 MG/DL (ref 0.51–0.95)
EGFRCR SERPLBLD CKD-EPI 2021: 44 ML/MIN/1.73M2
FASTING STATUS PATIENT QL REPORTED: YES
GLUCOSE SERPL-MCNC: 93 MG/DL (ref 70–99)
HCO3 SERPL-SCNC: 26 MMOL/L (ref 22–29)
HDLC SERPL-MCNC: 82 MG/DL
LDLC SERPL CALC-MCNC: 72 MG/DL
NONHDLC SERPL-MCNC: 92 MG/DL
POTASSIUM SERPL-SCNC: 4.5 MMOL/L (ref 3.4–5.3)
SODIUM SERPL-SCNC: 139 MMOL/L (ref 135–145)
TRIGL SERPL-MCNC: 102 MG/DL

## 2025-02-20 NOTE — PROGRESS NOTES
Message from Dr. Sanders re: Ab Zimmerman MD  P [facility] Team 2  Unchanged I suspect she has an upcoming apt    Patient to see Dr. Sanders on 3/28/2025

## 2025-03-28 PROBLEM — E66.09 CLASS 1 OBESITY DUE TO EXCESS CALORIES WITHOUT SERIOUS COMORBIDITY WITH BODY MASS INDEX (BMI) OF 31.0 TO 31.9 IN ADULT: Status: ACTIVE | Noted: 2023-11-22

## 2025-03-28 PROBLEM — E66.811 CLASS 1 OBESITY DUE TO EXCESS CALORIES WITHOUT SERIOUS COMORBIDITY WITH BODY MASS INDEX (BMI) OF 31.0 TO 31.9 IN ADULT: Status: ACTIVE | Noted: 2023-11-22

## 2025-04-07 ENCOUNTER — ALLIED HEALTH/NURSE VISIT (OUTPATIENT)
Dept: CARDIOLOGY | Facility: CLINIC | Age: 75
End: 2025-04-07
Attending: INTERNAL MEDICINE
Payer: MEDICARE

## 2025-04-07 ENCOUNTER — LAB (OUTPATIENT)
Dept: LAB | Facility: CLINIC | Age: 75
End: 2025-04-07
Payer: MEDICARE

## 2025-04-07 ENCOUNTER — TELEPHONE (OUTPATIENT)
Dept: CARDIOLOGY | Facility: CLINIC | Age: 75
End: 2025-04-07

## 2025-04-07 DIAGNOSIS — I10 BENIGN ESSENTIAL HYPERTENSION: ICD-10-CM

## 2025-04-07 LAB
ANION GAP SERPL CALCULATED.3IONS-SCNC: 13 MMOL/L (ref 7–15)
BUN SERPL-MCNC: 29.2 MG/DL (ref 8–23)
CALCIUM SERPL-MCNC: 9.7 MG/DL (ref 8.8–10.4)
CHLORIDE SERPL-SCNC: 101 MMOL/L (ref 98–107)
CREAT SERPL-MCNC: 1.29 MG/DL (ref 0.51–0.95)
EGFRCR SERPLBLD CKD-EPI 2021: 44 ML/MIN/1.73M2
GLUCOSE SERPL-MCNC: 93 MG/DL (ref 70–99)
HCO3 SERPL-SCNC: 23 MMOL/L (ref 22–29)
POTASSIUM SERPL-SCNC: 4 MMOL/L (ref 3.4–5.3)
SODIUM SERPL-SCNC: 137 MMOL/L (ref 135–145)

## 2025-04-07 PROCEDURE — 99207 PR NO CHARGE NURSE ONLY: CPT

## 2025-04-07 PROCEDURE — 80048 BASIC METABOLIC PNL TOTAL CA: CPT | Performed by: INTERNAL MEDICINE

## 2025-04-07 PROCEDURE — 36415 COLL VENOUS BLD VENIPUNCTURE: CPT | Performed by: INTERNAL MEDICINE

## 2025-04-07 NOTE — TELEPHONE ENCOUNTER
Ab Sanders MD Hiljus, Audrey G, RN  Caller: Unspecified (Today,  2:08 PM)    Looks good.       Called patient, reviewed results and plan to continue current medications. Patient to follow up in 1 year or sooner if needed.

## 2025-04-07 NOTE — TELEPHONE ENCOUNTER
Last office visit: 3-28-25               Previous blood pressure:  139/96    Mm Hg      Previous heart rate:  84    bpm     Time of readinPM     Morning medications were taken at: 8AM     Today's blood pressure:  125/85     mm Hg     Today's heart rate:    90   bpm      Ordering Provider: Dr. Sanders     Results sent to team # : Team 2     Additional comments: patient stated she started amplodipine 2.5mg 10 days ago.  Michelle Chao LPN

## 2025-04-07 NOTE — TELEPHONE ENCOUNTER
"BMP/BP check done today, routed to Dr Sanders.     OV note 3/28/25- \"Blood pressure is high today which may be contributing factor.  I will add amlodipine 2.5 mg daily.  I will have her follow-up with nurse blood pressure check in 1 week.\"       Component      Latest Ref Rng 2/20/2025  9:38 AM 4/7/2025  1:30 PM   Sodium      135 - 145 mmol/L 139  137    Potassium      3.4 - 5.3 mmol/L 4.5  4.0    Chloride      98 - 107 mmol/L 102  101    Carbon Dioxide (CO2)      22 - 29 mmol/L 26  23    Anion Gap      7 - 15 mmol/L 11  13    Urea Nitrogen      8.0 - 23.0 mg/dL 29.7 (H)  29.2 (H)    Creatinine      0.51 - 0.95 mg/dL 1.29 (H)  1.29 (H)    GFR Estimate      >60 mL/min/1.73m2 44 (L)  44 (L)    Calcium      8.8 - 10.4 mg/dL 9.8  9.7    Glucose      70 - 99 mg/dL 93  93      "

## 2025-04-19 ENCOUNTER — HEALTH MAINTENANCE LETTER (OUTPATIENT)
Age: 75
End: 2025-04-19

## 2025-06-21 ENCOUNTER — HEALTH MAINTENANCE LETTER (OUTPATIENT)
Age: 75
End: 2025-06-21